# Patient Record
Sex: MALE | Race: WHITE | Employment: OTHER | URBAN - METROPOLITAN AREA
[De-identification: names, ages, dates, MRNs, and addresses within clinical notes are randomized per-mention and may not be internally consistent; named-entity substitution may affect disease eponyms.]

---

## 2021-10-29 ENCOUNTER — IMMUNIZATION (OUTPATIENT)
Dept: NURSING | Facility: CLINIC | Age: 61
End: 2021-10-29
Payer: COMMERCIAL

## 2021-10-29 PROCEDURE — 0004A PR COVID VAC PFIZER DIL RECON 30 MCG/0.3 ML IM: CPT

## 2021-10-29 PROCEDURE — 91300 PR COVID VAC PFIZER DIL RECON 30 MCG/0.3 ML IM: CPT

## 2022-03-14 VITALS
OXYGEN SATURATION: 95 % | TEMPERATURE: 97.6 F | SYSTOLIC BLOOD PRESSURE: 133 MMHG | WEIGHT: 180 LBS | DIASTOLIC BLOOD PRESSURE: 82 MMHG | RESPIRATION RATE: 16 BRPM | HEART RATE: 66 BPM

## 2022-03-14 PROCEDURE — 23500 CLTX CLAVICULAR FX W/O MNPJ: CPT | Mod: LT

## 2022-03-14 PROCEDURE — 99284 EMERGENCY DEPT VISIT MOD MDM: CPT | Mod: 25

## 2022-03-15 ENCOUNTER — HOSPITAL ENCOUNTER (EMERGENCY)
Facility: HOSPITAL | Age: 62
Discharge: HOME | End: 2022-03-15
Attending: EMERGENCY MEDICINE | Admitting: EMERGENCY MEDICINE
Payer: COMMERCIAL

## 2022-03-15 ENCOUNTER — APPOINTMENT (OUTPATIENT)
Dept: RADIOLOGY | Facility: HOSPITAL | Age: 62
End: 2022-03-15
Attending: EMERGENCY MEDICINE
Payer: COMMERCIAL

## 2022-03-15 DIAGNOSIS — M25.512 ACUTE PAIN OF LEFT SHOULDER: ICD-10-CM

## 2022-03-15 DIAGNOSIS — S42.032A CLOSED DISPLACED FRACTURE OF ACROMIAL END OF LEFT CLAVICLE, INITIAL ENCOUNTER: ICD-10-CM
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
## 2022-03-15 PROCEDURE — 73030 X-RAY EXAM OF SHOULDER: CPT | Mod: LT

## 2022-03-15 ASSESSMENT — ENCOUNTER SYMPTOMS
SHORTNESS OF BREATH: 0
NECK PAIN: 0
DIZZINESS: 0
BACK PAIN: 0
NUMBNESS: 1
CONSTITUTIONAL NEGATIVE: 1
PALPITATIONS: 0

## 2022-03-15 NOTE — DISCHARGE INSTRUCTIONS
You were seen in the emergency department today for shoulder pain after a fall.  As we discussed, your x-ray showed that you broke your clavicle.     To help with pain:  - Ice the injury for 20 minutes, 3-5 times per day (or up to every 2 hours as needed) for the first 24-72 hours. You can either use an ice pack or plastic bag filled with ice, cover either one with a damp cloth to prevent the cold from burning your skin.   - Wear the sling.   - Acetaminophen (Tylenol) 650 mgevery 8 hours. Do not take more than 3000 mg per day.   - Ibuprofen (Motrin, Advil) 400-600 mg every 6 hours. Do not take more than 3200 mg per day.   - Oxycodone 1-2 tablets every 4-6 hours for the [id number] hours. Only use these for severe pain that does not get better with tylenol and ibuprofen. This may make you feel drowsy so you should try to take it at night to lessen the pain and help you sleep.     You should take ibuprofen and tylenol for baseline pain. Write down the times you are taking both medications to ensure appropriate time in between doses.    If tylenol and ibuprofen are not enough, you can take theoxycodone. This does not contain acetaminophen so it is safe to take the tylenol/acetaminophen at the same time.   Note: Oxycodone is a strong narcotic pain medication that can lead to addiction and should beused carefully.  Please don't take more than the recommended dose and limit your use of this medication as much as possible.  Please don't take this medication with other medications or drugs that are also sedating or causeyou to feel sleepy (alcohol, benzodiazepines, etc) because it may impair your ability to breathe.  In addition, please do not take oxycodone prior to performing activities such as driving, operating power tools, or otheractivities that carry a risk of bodily harm.    You will likely find that you are more sore over the next 1-2 days, but please return to the Emergency Department if you have a significant  increase in pain, difficulty breathing, numbness, weakness, or any other new or concerning symptoms. Otherwise please follow up at [facility] tomorrow for recheck.    Below is some information that you might find informative and useful.    Thank you for choosing [facility]. It was a pleasure taking care of you today!  - Dr. Nissa Mckay

## 2022-03-15 NOTE — ED PROVIDER NOTES
EMERGENCY DEPARTMENT ENCOUNTER      NAME: Adan Tan  YOB: 1960  MRN: [id number]      FINAL IMPRESSION  1. Closed displaced fracture of acromial end of left clavicle, initial encounter    2. Acute pain of left shoulder    3. Fall, initial encounter        MEDICAL DECISION MAKING   Pertinent Labs & Imaging studies reviewed. (See chart for details)    Adan Tan is a 61 year old male who presents for evaluation of left shoulder pain after a fall.  Patient reports falling down about 3 stairs earlier this evening.  He landed on his left shoulder but denies associated head trauma or loss of consciousness.  He was able to get up after the fall and has been ambulatory since.  His only complaint is of pain to his left shoulder area.  He notes that he is supposed to have bilateral shoulder replacement at some point but that the right shoulder is actually more problematic than the left.  He has no other new complaints or injuries and had no preceding symptoms to suggest syncope or cardiopulmonary/neurologic etiology of fall.  He does admit that he was drinking earlier this evening.  Friend gave patient Tylenol prior to bringing him in and he has had some improvement in discomfort.  Patient is right-hand dominant.  Vitals on arrival stable. Remainder of history and exam, as below.     X-ray of the left shoulder was ordered while patient was awaiting room in triage.  This revealed a displaced fracture of the distal clavicle with associated AC separation.  I suspect this to be etiology of patient's pain.  I reviewed these results with him at time of my initial assessment.  He had no other concerning complaints or exam findings that I feel would indicate further work-up/advanced imaging.  We discussed options for further management and have agreed on plan for discharge with a sling and close follow-up in [facility] tomorrow.  I did offer to give and/or prescribe patient something  stronger to take for pain but he declined, as he has oxycodone left over from a previous hip replacement that he never used.  He also is not interested in any analgesic here in the department, states that he is very comfortable if he keeps his arm still.  I encouraged him to use Tylenol/ibuprofen, rest, ice primarily and take the oxycodone if these measures are not helpful.    Patient was placed in a sling and felt more comfortable with additional support from his arm.  After this, he was eager and ready to go home.  He feels comfortable with plan to follow-up in clinic. We reviewed warning signs and symptoms, and I instructed Mr. Tan to return to the emergency department immediately if he develops any new or worsening symptoms. I provided additional verbal discharge instructions. Mr. Tan expressed understanding and agreement with this plan of care, his questions were answered, and he was discharged in stable condition.       ED COURSE  12:52 AM I met with the patient, obtained history, performed an initial exam, and discussed options and plan for diagnostics and treatment here in the ED. Also updated with XR and discussed discharge plans. He was agreeable with plan. PPE worn: surgical mask, gloves and eye protection.       MEDICATIONS GIVEN IN THE ED  Medications - No data to display    NEW PRESCRIPTIONS STARTED AT TODAY'S VISIT  There are no discharge medications for this patient.         =================================================================    Chief Complaint   Patient presents with     Fall     Shoulder Injury         HPI:    Patient information was obtained from: patient     Use of : N/A     Adan Tan is a 61 year old male with PMHx of arthritis of right hip and shoulder, who presents mechanical fall and left shoulder pain.    Patient sustained mechanical fall ~10:30 PM last night down 3 steps of stairs. He did have 4 glasses of wine tonight before incident. He did hit his  left shoulder against the wall immediately endorsing pain and landed on the ground on his head. He did not sustain LOC or any other injuries noted. His pain does worsen with movement but at rest, the pain is not triggered. Prior to arrival, he did take medication for pain management. He was able to get up and ambulate well. He was previously on blood thinners but no longer taking them since end of February. He is right hand dominant. He does note having chronic slight numbness from his arthritis but nothing out of the ordinary. He does mention leaving this Wednesday to [facility] since his wife is a travel nurse. Otherwise, no neck pain, back pain, leg pain, palpitations, dizziness, shortness of breath. No other medical complaints at this time.       RELEVANT HISTORY, MEDICATIONS, & ALLERGIES   Past medical history, surgical history, family history, medications, and allergies reviewed and pertinent noted in HPI. See end of note for comprehensive list.    REVIEW OF SYSTEMS:  Review of Systems   Constitutional: Negative.    Respiratory: Negative for shortness of breath.    Cardiovascular: Negative for chest pain and palpitations.   Musculoskeletal: Negative for back pain and neck pain.        Positive left shoulder pain. Negative leg pain   Neurological: Positive for numbness (chronic, attributes to known degenerative disease in shoulders). Negative for dizziness and syncope.   All other systems reviewed and are negative.      PHYSICAL EXAM:    Vitals: /82   Pulse 66   Temp 97.6  F (36.4  C) (Oral)   Resp 16   Wt 81.6 kg (180 lb)   SpO2 95%   General: Awake, alert, interactive.  Sitting up in wheelchair.   Eyes: PERRL.   HENT: Atraumatic. MMM.   Neck: Full AROM.  No midline tenderness.  Cardiovascular: Regular rate and rhythm.  2+ radial pulses.  Respiratory/Chest: Normal work of breathing.  Lungs clear to auscultation bilaterally.  No chest wall tenderness to palpation or external signs of  trauma.  Abdomen: Non-distended.   Left upper extremity: Holding arm in flexion at 90 degrees at elbow abducted towards body.  Mild tenderness palpation over distal clavicle.  Limited range of motion of shoulder secondary to pain.  Normal range of motion of wrist and elbow.  Sensation intact all distributions.  No tenderness to palpation of long bones, elbow, wrist, hand.  MSK: Otherwise normal appearing extremities without obvious deformities or signs of trauma.  No midline tenderness to palpation of spine.  Skin: Normal color. No rash or diaphoresis.  Neurologic: Alert, oriented. Speech clear. CN's grossly intact.   Psychiatric: Normal affect/mood.     RADIOLOGY  XR Shoulder Left G/E 3 Views   Final Result   IMPRESSION: Fracture of the distal clavicle and subluxation at the AC joint. The acromion on is subluxed approximately 4 cm inferior to the distal clavicle. Chronic deformity of the humeral head. No dislocation.           Comprehensive outline of EPIC chart Hx  PAST MEDICAL HISTORY    History reviewed. No pertinent past medical history.  History reviewed. No pertinent surgical history.    CURRENT MEDICATIONS    No current outpatient medications    ALLERGIES    No Known Allergies    FAMILY HISTORY    History reviewed. No pertinent family history.    SOCIAL HISTORY      Occasional alcohol use  Non-smoker    IApple, am serving as a scribe to document services personally performed by Dr. Nissa Mckay based on my observation and the provider's statements to me. INissa MD attest that Apple Ponce is acting in a scribe capacity, has observed my performance of the services and has documented them in accordance with my direction.    Nissa Mckay M.D.  Emergency Medicine  [facility] EMERGENCY DEPARTMENT  [facility] [facility] [id number] [phone]  Dept: [phone]     Nissa Mckay MD  03/15/22 [id number]

## 2022-03-15 NOTE — ED TRIAGE NOTES
Pt was walking downstairs to go to bed and tripped down the last 4-5 stairs, hitting his head and hurting his left shoulder. No LOC. No thinners. Last meal 1900, also after that has had about 4 good size glasses of wine throughout the evening. Medics came to the home and gave pt the option of being transported or to drive in with his friend. Friend is here and drove pt. Left shoulder could be dislocated from appearance in triage.

## 2022-03-16 ENCOUNTER — ANESTHESIA EVENT (OUTPATIENT)
Dept: SURGERY | Facility: HOSPITAL | Age: 62
End: 2022-03-16
Payer: COMMERCIAL

## 2022-03-16 ENCOUNTER — HOSPITAL ENCOUNTER (INPATIENT)
Facility: HOSPITAL | Age: 62
LOS: 4 days | Discharge: HOME | End: 2022-03-20
Attending: EMERGENCY MEDICINE | Admitting: STUDENT IN AN ORGANIZED HEALTH CARE EDUCATION/TRAINING PROGRAM
Payer: COMMERCIAL

## 2022-03-16 ENCOUNTER — APPOINTMENT (OUTPATIENT)
Dept: CT IMAGING | Facility: HOSPITAL | Age: 62
End: 2022-03-16
Payer: COMMERCIAL

## 2022-03-16 ENCOUNTER — ANESTHESIA (OUTPATIENT)
Dept: SURGERY | Facility: HOSPITAL | Age: 62
End: 2022-03-16
Payer: COMMERCIAL

## 2022-03-16 ENCOUNTER — APPOINTMENT (OUTPATIENT)
Dept: RADIOLOGY | Facility: HOSPITAL | Age: 62
End: 2022-03-16
Attending: SURGERY
Payer: COMMERCIAL

## 2022-03-16 DIAGNOSIS — J94.2 HEMOTHORAX ON LEFT: Primary | ICD-10-CM

## 2022-03-16 PROBLEM — N52.9 ERECTILE DYSFUNCTION: Status: ACTIVE | Noted: 2021-10-20

## 2022-03-16 PROBLEM — M19.019 ARTHRITIS OF SHOULDER: Status: ACTIVE | Noted: 2021-10-20

## 2022-03-16 PROBLEM — M16.11 ARTHRITIS OF RIGHT HIP: Status: ACTIVE | Noted: 2021-10-20

## 2022-03-16 LAB
ABO/RH(D): NORMAL
ALBUMIN SERPL-MCNC: 3.5 G/DL (ref 3.5–5)
ALP SERPL-CCNC: 56 U/L (ref 45–120)
ALT SERPL W P-5'-P-CCNC: 13 U/L (ref 0–45)
ANION GAP SERPL CALCULATED.3IONS-SCNC: 10 MMOL/L (ref 5–18)
ANTIBODY SCREEN: NEGATIVE
AST SERPL W P-5'-P-CCNC: 22 U/L (ref 0–40)
ATRIAL RATE - MUSE: 101 BPM
BASOPHILS # BLD AUTO: 0 10E3/UL (ref 0–0.2)
BASOPHILS NFR BLD AUTO: 0 %
BILIRUB SERPL-MCNC: 0.9 MG/DL (ref 0–1)
BUN SERPL-MCNC: 14 MG/DL (ref 8–22)
CALCIUM SERPL-MCNC: 8.5 MG/DL (ref 8.5–10.5)
CHLORIDE BLD-SCNC: 103 MMOL/L (ref 98–107)
CO2 SERPL-SCNC: 24 MMOL/L (ref 22–31)
CREAT BLD-MCNC: 0.6 MG/DL (ref 0.7–1.3)
CREAT SERPL-MCNC: 0.61 MG/DL (ref 0.7–1.3)
CREAT SERPL-MCNC: 0.72 MG/DL (ref 0.7–1.3)
DIASTOLIC BLOOD PRESSURE - MUSE: NORMAL MMHG
EOSINOPHIL # BLD AUTO: 0 10E3/UL (ref 0–0.7)
EOSINOPHIL NFR BLD AUTO: 0 %
ERYTHROCYTE [DISTWIDTH] IN BLOOD BY AUTOMATED COUNT: 12.7 % (ref 10–15)
GFR SERPL CREATININE-BSD FRML MDRD: >60 ML/MIN/1.73M2
GFR SERPL CREATININE-BSD FRML MDRD: >90 ML/MIN/1.73M2
GFR SERPL CREATININE-BSD FRML MDRD: >90 ML/MIN/1.73M2
GLUCOSE BLD-MCNC: 168 MG/DL (ref 70–125)
HCT VFR BLD AUTO: 34.5 % (ref 40–53)
HGB BLD-MCNC: 11.3 G/DL (ref 13.3–17.7)
IMM GRANULOCYTES # BLD: 0.1 10E3/UL
IMM GRANULOCYTES NFR BLD: 1 %
INTERPRETATION ECG - MUSE: NORMAL
LYMPHOCYTES # BLD AUTO: 1 10E3/UL (ref 0.8–5.3)
LYMPHOCYTES NFR BLD AUTO: 6 %
MCH RBC QN AUTO: 34 PG (ref 26.5–33)
MCHC RBC AUTO-ENTMCNC: 32.8 G/DL (ref 31.5–36.5)
MCV RBC AUTO: 104 FL (ref 78–100)
MONOCYTES # BLD AUTO: 1.4 10E3/UL (ref 0–1.3)
MONOCYTES NFR BLD AUTO: 9 %
NEUTROPHILS # BLD AUTO: 13.2 10E3/UL (ref 1.6–8.3)
NEUTROPHILS NFR BLD AUTO: 84 %
NRBC # BLD AUTO: 0 10E3/UL
NRBC BLD AUTO-RTO: 0 /100
P AXIS - MUSE: 51 DEGREES
PLATELET # BLD AUTO: 231 10E3/UL (ref 150–450)
POTASSIUM BLD-SCNC: 3.7 MMOL/L (ref 3.5–5)
PR INTERVAL - MUSE: 154 MS
PROT SERPL-MCNC: 6 G/DL (ref 6–8)
QRS DURATION - MUSE: 82 MS
QT - MUSE: 332 MS
QTC - MUSE: 430 MS
R AXIS - MUSE: 42 DEGREES
RBC # BLD AUTO: 3.32 10E6/UL (ref 4.4–5.9)
SARS-COV-2 RNA RESP QL NAA+PROBE: NEGATIVE
SODIUM SERPL-SCNC: 137 MMOL/L (ref 136–145)
SPECIMEN EXPIRATION DATE: NORMAL
SYSTOLIC BLOOD PRESSURE - MUSE: NORMAL MMHG
T AXIS - MUSE: 26 DEGREES
VENTRICULAR RATE- MUSE: 101 BPM
WBC # BLD AUTO: 15.6 10E3/UL (ref 4–11)

## 2022-03-16 PROCEDURE — 999N000141 HC STATISTIC PRE-PROCEDURE NURSING ASSESSMENT: Performed by: SURGERY

## 2022-03-16 PROCEDURE — 258N000003 HC RX IP 258 OP 636: Performed by: SURGERY

## 2022-03-16 PROCEDURE — 85025 COMPLETE CBC W/AUTO DIFF WBC: CPT | Performed by: PHYSICIAN ASSISTANT

## 2022-03-16 PROCEDURE — 250N000011 HC RX IP 250 OP 636: Performed by: SURGERY

## 2022-03-16 PROCEDURE — 96361 HYDRATE IV INFUSION ADD-ON: CPT

## 2022-03-16 PROCEDURE — 36415 COLL VENOUS BLD VENIPUNCTURE: CPT | Performed by: SURGERY

## 2022-03-16 PROCEDURE — 87635 SARS-COV-2 COVID-19 AMP PRB: CPT | Performed by: PHYSICIAN ASSISTANT

## 2022-03-16 PROCEDURE — 999N000065 XR CHEST PORT 1 VIEW

## 2022-03-16 PROCEDURE — 272N000001 HC OR GENERAL SUPPLY STERILE: Performed by: SURGERY

## 2022-03-16 PROCEDURE — 36415 COLL VENOUS BLD VENIPUNCTURE: CPT | Performed by: ANESTHESIOLOGY

## 2022-03-16 PROCEDURE — 32601 THORACOSCOPY DIAGNOSTIC: CPT | Performed by: SURGERY

## 2022-03-16 PROCEDURE — 82565 ASSAY OF CREATININE: CPT | Performed by: SURGERY

## 2022-03-16 PROCEDURE — 99222 1ST HOSP IP/OBS MODERATE 55: CPT | Mod: AI | Performed by: STUDENT IN AN ORGANIZED HEALTH CARE EDUCATION/TRAINING PROGRAM

## 2022-03-16 PROCEDURE — C9803 HOPD COVID-19 SPEC COLLECT: HCPCS

## 2022-03-16 PROCEDURE — 250N000011 HC RX IP 250 OP 636: Performed by: ANESTHESIOLOGY

## 2022-03-16 PROCEDURE — 86901 BLOOD TYPING SEROLOGIC RH(D): CPT | Performed by: ANESTHESIOLOGY

## 2022-03-16 PROCEDURE — 250N000011 HC RX IP 250 OP 636: Performed by: NURSE ANESTHETIST, CERTIFIED REGISTERED

## 2022-03-16 PROCEDURE — 96360 HYDRATION IV INFUSION INIT: CPT

## 2022-03-16 PROCEDURE — 250N000011 HC RX IP 250 OP 636: Performed by: PHYSICIAN ASSISTANT

## 2022-03-16 PROCEDURE — 258N000003 HC RX IP 258 OP 636: Performed by: PHYSICIAN ASSISTANT

## 2022-03-16 PROCEDURE — P9041 ALBUMIN (HUMAN),5%, 50ML: HCPCS | Performed by: NURSE ANESTHETIST, CERTIFIED REGISTERED

## 2022-03-16 PROCEDURE — 80053 COMPREHEN METABOLIC PANEL: CPT | Performed by: PHYSICIAN ASSISTANT

## 2022-03-16 PROCEDURE — 86850 RBC ANTIBODY SCREEN: CPT | Performed by: ANESTHESIOLOGY

## 2022-03-16 PROCEDURE — 710N000009 HC RECOVERY PHASE 1, LEVEL 1, PER MIN: Performed by: SURGERY

## 2022-03-16 PROCEDURE — 120N000001 HC R&B MED SURG/OB

## 2022-03-16 PROCEDURE — 250N000013 HC RX MED GY IP 250 OP 250 PS 637: Performed by: ANESTHESIOLOGY

## 2022-03-16 PROCEDURE — 258N000003 HC RX IP 258 OP 636: Performed by: ANESTHESIOLOGY

## 2022-03-16 PROCEDURE — 99222 1ST HOSP IP/OBS MODERATE 55: CPT | Mod: 25 | Performed by: SURGERY

## 2022-03-16 PROCEDURE — 258N000003 HC RX IP 258 OP 636: Performed by: NURSE ANESTHETIST, CERTIFIED REGISTERED

## 2022-03-16 PROCEDURE — 258N000001 HC RX 258: Performed by: SURGERY

## 2022-03-16 PROCEDURE — 250N000013 HC RX MED GY IP 250 OP 250 PS 637: Performed by: SURGERY

## 2022-03-16 PROCEDURE — 250N000009 HC RX 250: Performed by: SURGERY

## 2022-03-16 PROCEDURE — 93005 ELECTROCARDIOGRAM TRACING: CPT | Performed by: STUDENT IN AN ORGANIZED HEALTH CARE EDUCATION/TRAINING PROGRAM

## 2022-03-16 PROCEDURE — 0W9B30Z DRAINAGE OF LEFT PLEURAL CAVITY WITH DRAINAGE DEVICE, PERCUTANEOUS APPROACH: ICD-10-PCS | Performed by: SURGERY

## 2022-03-16 PROCEDURE — 250N000009 HC RX 250: Performed by: NURSE ANESTHETIST, CERTIFIED REGISTERED

## 2022-03-16 PROCEDURE — 0W9B3ZZ DRAINAGE OF LEFT PLEURAL CAVITY, PERCUTANEOUS APPROACH: ICD-10-PCS | Performed by: SURGERY

## 2022-03-16 PROCEDURE — 360N000077 HC SURGERY LEVEL 4, PER MIN: Performed by: SURGERY

## 2022-03-16 PROCEDURE — 370N000017 HC ANESTHESIA TECHNICAL FEE, PER MIN: Performed by: SURGERY

## 2022-03-16 PROCEDURE — 82565 ASSAY OF CREATININE: CPT

## 2022-03-16 PROCEDURE — 74177 CT ABD & PELVIS W/CONTRAST: CPT

## 2022-03-16 PROCEDURE — 250N000025 HC SEVOFLURANE, PER MIN: Performed by: SURGERY

## 2022-03-16 PROCEDURE — 0W9930Z DRAINAGE OF RIGHT PLEURAL CAVITY WITH DRAINAGE DEVICE, PERCUTANEOUS APPROACH: ICD-10-PCS | Performed by: SURGERY

## 2022-03-16 PROCEDURE — 36415 COLL VENOUS BLD VENIPUNCTURE: CPT | Performed by: PHYSICIAN ASSISTANT

## 2022-03-16 PROCEDURE — 93005 ELECTROCARDIOGRAM TRACING: CPT | Performed by: EMERGENCY MEDICINE

## 2022-03-16 PROCEDURE — 99285 EMERGENCY DEPT VISIT HI MDM: CPT | Mod: 25

## 2022-03-16 RX ORDER — BUPIVACAINE HYDROCHLORIDE 2.5 MG/ML
INJECTION, SOLUTION EPIDURAL; INFILTRATION; INTRACAUDAL PRN
Status: DISCONTINUED | OUTPATIENT
Start: 2022-03-16 | End: 2022-03-16

## 2022-03-16 RX ORDER — SODIUM CHLORIDE, SODIUM LACTATE, POTASSIUM CHLORIDE, CALCIUM CHLORIDE 600; 310; 30; 20 MG/100ML; MG/100ML; MG/100ML; MG/100ML
INJECTION, SOLUTION INTRAVENOUS CONTINUOUS
Status: DISCONTINUED | OUTPATIENT
Start: 2022-03-16 | End: 2022-03-16 | Stop reason: HOSPADM

## 2022-03-16 RX ORDER — ONDANSETRON 2 MG/ML
4 INJECTION INTRAMUSCULAR; INTRAVENOUS EVERY 6 HOURS PRN
Status: DISCONTINUED | OUTPATIENT
Start: 2022-03-16 | End: 2022-03-20 | Stop reason: HOSPADM

## 2022-03-16 RX ORDER — ALBUMIN, HUMAN INJ 5% 5 %
SOLUTION INTRAVENOUS CONTINUOUS PRN
Status: DISCONTINUED | OUTPATIENT
Start: 2022-03-16 | End: 2022-03-16

## 2022-03-16 RX ORDER — SODIUM CHLORIDE 9 MG/ML
INJECTION, SOLUTION INTRAVENOUS CONTINUOUS
Status: DISCONTINUED | OUTPATIENT
Start: 2022-03-16 | End: 2022-03-17

## 2022-03-16 RX ORDER — ACETAMINOPHEN 325 MG/1
650 TABLET ORAL EVERY 4 HOURS PRN
Status: DISCONTINUED | OUTPATIENT
Start: 2022-03-19 | End: 2022-03-20 | Stop reason: HOSPADM

## 2022-03-16 RX ORDER — NALOXONE HYDROCHLORIDE 0.4 MG/ML
0.4 INJECTION, SOLUTION INTRAMUSCULAR; INTRAVENOUS; SUBCUTANEOUS
Status: DISCONTINUED | OUTPATIENT
Start: 2022-03-16 | End: 2022-03-20 | Stop reason: HOSPADM

## 2022-03-16 RX ORDER — ACETAMINOPHEN 325 MG/1
975 TABLET ORAL EVERY 8 HOURS
Status: COMPLETED | OUTPATIENT
Start: 2022-03-16 | End: 2022-03-19

## 2022-03-16 RX ORDER — FENTANYL CITRATE 50 UG/ML
INJECTION, SOLUTION INTRAMUSCULAR; INTRAVENOUS PRN
Status: DISCONTINUED | OUTPATIENT
Start: 2022-03-16 | End: 2022-03-16

## 2022-03-16 RX ORDER — PROPOFOL 10 MG/ML
INJECTION, EMULSION INTRAVENOUS CONTINUOUS PRN
Status: DISCONTINUED | OUTPATIENT
Start: 2022-03-16 | End: 2022-03-16

## 2022-03-16 RX ORDER — ONDANSETRON 4 MG/1
4 TABLET, ORALLY DISINTEGRATING ORAL EVERY 6 HOURS PRN
Status: DISCONTINUED | OUTPATIENT
Start: 2022-03-16 | End: 2022-03-16

## 2022-03-16 RX ORDER — MAGNESIUM HYDROXIDE 1200 MG/15ML
LIQUID ORAL PRN
Status: DISCONTINUED | OUTPATIENT
Start: 2022-03-16 | End: 2022-03-20

## 2022-03-16 RX ORDER — SODIUM CHLORIDE, SODIUM LACTATE, POTASSIUM CHLORIDE, CALCIUM CHLORIDE 600; 310; 30; 20 MG/100ML; MG/100ML; MG/100ML; MG/100ML
INJECTION, SOLUTION INTRAVENOUS CONTINUOUS PRN
Status: DISCONTINUED | OUTPATIENT
Start: 2022-03-16 | End: 2022-03-16

## 2022-03-16 RX ORDER — CEFAZOLIN SODIUM/WATER 2 G/20 ML
2 SYRINGE (ML) INTRAVENOUS
Status: COMPLETED | OUTPATIENT
Start: 2022-03-16 | End: 2022-03-16

## 2022-03-16 RX ORDER — HYDROMORPHONE HCL IN WATER/PF 6 MG/30 ML
0.4 PATIENT CONTROLLED ANALGESIA SYRINGE INTRAVENOUS
Status: DISCONTINUED | OUTPATIENT
Start: 2022-03-16 | End: 2022-03-20 | Stop reason: HOSPADM

## 2022-03-16 RX ORDER — ONDANSETRON 2 MG/ML
4 INJECTION INTRAMUSCULAR; INTRAVENOUS EVERY 6 HOURS PRN
Status: DISCONTINUED | OUTPATIENT
Start: 2022-03-16 | End: 2022-03-16

## 2022-03-16 RX ORDER — DEXAMETHASONE SODIUM PHOSPHATE 10 MG/ML
INJECTION, SOLUTION INTRAMUSCULAR; INTRAVENOUS PRN
Status: DISCONTINUED | OUTPATIENT
Start: 2022-03-16 | End: 2022-03-16

## 2022-03-16 RX ORDER — BISACODYL 10 MG
10 SUPPOSITORY, RECTAL RECTAL DAILY PRN
Status: DISCONTINUED | OUTPATIENT
Start: 2022-03-16 | End: 2022-03-20 | Stop reason: HOSPADM

## 2022-03-16 RX ORDER — ONDANSETRON 4 MG/1
4 TABLET, ORALLY DISINTEGRATING ORAL EVERY 6 HOURS PRN
Status: DISCONTINUED | OUTPATIENT
Start: 2022-03-16 | End: 2022-03-20 | Stop reason: HOSPADM

## 2022-03-16 RX ORDER — HYDROMORPHONE HYDROCHLORIDE 1 MG/ML
0.4 INJECTION, SOLUTION INTRAMUSCULAR; INTRAVENOUS; SUBCUTANEOUS EVERY 5 MIN PRN
Status: DISCONTINUED | OUTPATIENT
Start: 2022-03-16 | End: 2022-03-16 | Stop reason: HOSPADM

## 2022-03-16 RX ORDER — PROPOFOL 10 MG/ML
INJECTION, EMULSION INTRAVENOUS PRN
Status: DISCONTINUED | OUTPATIENT
Start: 2022-03-16 | End: 2022-03-16

## 2022-03-16 RX ORDER — IOPAMIDOL 755 MG/ML
100 INJECTION, SOLUTION INTRAVASCULAR ONCE
Status: COMPLETED | OUTPATIENT
Start: 2022-03-16 | End: 2022-03-16

## 2022-03-16 RX ORDER — LIDOCAINE 40 MG/G
CREAM TOPICAL
Status: DISCONTINUED | OUTPATIENT
Start: 2022-03-16 | End: 2022-03-20 | Stop reason: HOSPADM

## 2022-03-16 RX ORDER — MAGNESIUM SULFATE 4 G/50ML
4 INJECTION INTRAVENOUS ONCE
Status: COMPLETED | OUTPATIENT
Start: 2022-03-16 | End: 2022-03-16

## 2022-03-16 RX ORDER — NALBUPHINE HYDROCHLORIDE 10 MG/ML
2.5-5 INJECTION, SOLUTION INTRAMUSCULAR; INTRAVENOUS; SUBCUTANEOUS EVERY 6 HOURS PRN
Status: DISCONTINUED | OUTPATIENT
Start: 2022-03-16 | End: 2022-03-20 | Stop reason: HOSPADM

## 2022-03-16 RX ORDER — NALOXONE HYDROCHLORIDE 0.4 MG/ML
0.2 INJECTION, SOLUTION INTRAMUSCULAR; INTRAVENOUS; SUBCUTANEOUS
Status: DISCONTINUED | OUTPATIENT
Start: 2022-03-16 | End: 2022-03-20 | Stop reason: HOSPADM

## 2022-03-16 RX ORDER — OXYCODONE HYDROCHLORIDE 5 MG/1
10 TABLET ORAL EVERY 4 HOURS PRN
Status: DISCONTINUED | OUTPATIENT
Start: 2022-03-16 | End: 2022-03-20 | Stop reason: HOSPADM

## 2022-03-16 RX ORDER — OXYCODONE HYDROCHLORIDE 5 MG/1
5 TABLET ORAL EVERY 4 HOURS PRN
Status: DISCONTINUED | OUTPATIENT
Start: 2022-03-16 | End: 2022-03-16 | Stop reason: HOSPADM

## 2022-03-16 RX ORDER — ACETAMINOPHEN 325 MG/1
975 TABLET ORAL ONCE
Status: COMPLETED | OUTPATIENT
Start: 2022-03-16 | End: 2022-03-16

## 2022-03-16 RX ORDER — ONDANSETRON 2 MG/ML
INJECTION INTRAMUSCULAR; INTRAVENOUS PRN
Status: DISCONTINUED | OUTPATIENT
Start: 2022-03-16 | End: 2022-03-16

## 2022-03-16 RX ORDER — ONDANSETRON 4 MG/1
4 TABLET, ORALLY DISINTEGRATING ORAL EVERY 30 MIN PRN
Status: DISCONTINUED | OUTPATIENT
Start: 2022-03-16 | End: 2022-03-16 | Stop reason: HOSPADM

## 2022-03-16 RX ORDER — FENTANYL CITRATE 50 UG/ML
50 INJECTION, SOLUTION INTRAMUSCULAR; INTRAVENOUS EVERY 5 MIN PRN
Status: DISCONTINUED | OUTPATIENT
Start: 2022-03-16 | End: 2022-03-16 | Stop reason: HOSPADM

## 2022-03-16 RX ORDER — ONDANSETRON 2 MG/ML
4 INJECTION INTRAMUSCULAR; INTRAVENOUS EVERY 30 MIN PRN
Status: DISCONTINUED | OUTPATIENT
Start: 2022-03-16 | End: 2022-03-16 | Stop reason: HOSPADM

## 2022-03-16 RX ORDER — HYDROMORPHONE HCL IN WATER/PF 6 MG/30 ML
0.2 PATIENT CONTROLLED ANALGESIA SYRINGE INTRAVENOUS
Status: DISCONTINUED | OUTPATIENT
Start: 2022-03-16 | End: 2022-03-20 | Stop reason: HOSPADM

## 2022-03-16 RX ORDER — POLYETHYLENE GLYCOL 3350 17 G/17G
17 POWDER, FOR SOLUTION ORAL DAILY
Status: DISCONTINUED | OUTPATIENT
Start: 2022-03-17 | End: 2022-03-20 | Stop reason: HOSPADM

## 2022-03-16 RX ORDER — KETAMINE HYDROCHLORIDE 50 MG/ML
INJECTION, SOLUTION INTRAMUSCULAR; INTRAVENOUS PRN
Status: DISCONTINUED | OUTPATIENT
Start: 2022-03-16 | End: 2022-03-16

## 2022-03-16 RX ORDER — LIDOCAINE HYDROCHLORIDE 20 MG/ML
INJECTION, SOLUTION INFILTRATION; PERINEURAL PRN
Status: DISCONTINUED | OUTPATIENT
Start: 2022-03-16 | End: 2022-03-16

## 2022-03-16 RX ORDER — HALOPERIDOL 5 MG/ML
1 INJECTION INTRAMUSCULAR
Status: DISCONTINUED | OUTPATIENT
Start: 2022-03-16 | End: 2022-03-16 | Stop reason: HOSPADM

## 2022-03-16 RX ORDER — OXYCODONE HYDROCHLORIDE 5 MG/1
5 TABLET ORAL EVERY 4 HOURS PRN
Status: DISCONTINUED | OUTPATIENT
Start: 2022-03-16 | End: 2022-03-20 | Stop reason: HOSPADM

## 2022-03-16 RX ORDER — AMOXICILLIN 250 MG
1 CAPSULE ORAL 2 TIMES DAILY
Status: DISCONTINUED | OUTPATIENT
Start: 2022-03-16 | End: 2022-03-20 | Stop reason: HOSPADM

## 2022-03-16 RX ORDER — PROCHLORPERAZINE MALEATE 10 MG
10 TABLET ORAL EVERY 6 HOURS PRN
Status: DISCONTINUED | OUTPATIENT
Start: 2022-03-16 | End: 2022-03-20 | Stop reason: HOSPADM

## 2022-03-16 RX ORDER — LIDOCAINE 40 MG/G
CREAM TOPICAL
Status: DISCONTINUED | OUTPATIENT
Start: 2022-03-16 | End: 2022-03-16 | Stop reason: HOSPADM

## 2022-03-16 RX ORDER — CEFAZOLIN SODIUM/WATER 2 G/20 ML
2 SYRINGE (ML) INTRAVENOUS SEE ADMIN INSTRUCTIONS
Status: DISCONTINUED | OUTPATIENT
Start: 2022-03-16 | End: 2022-03-16 | Stop reason: HOSPADM

## 2022-03-16 RX ADMIN — FENTANYL CITRATE 50 MCG: 50 INJECTION, SOLUTION INTRAMUSCULAR; INTRAVENOUS at 15:29

## 2022-03-16 RX ADMIN — LIDOCAINE HYDROCHLORIDE 3 ML: 20 INJECTION, SOLUTION INFILTRATION; PERINEURAL at 14:01

## 2022-03-16 RX ADMIN — SODIUM CHLORIDE, POTASSIUM CHLORIDE, SODIUM LACTATE AND CALCIUM CHLORIDE: 600; 310; 30; 20 INJECTION, SOLUTION INTRAVENOUS at 14:48

## 2022-03-16 RX ADMIN — ROCURONIUM BROMIDE 20 MG: 50 INJECTION, SOLUTION INTRAVENOUS at 14:44

## 2022-03-16 RX ADMIN — FENTANYL CITRATE 50 MCG: 50 INJECTION, SOLUTION INTRAMUSCULAR; INTRAVENOUS at 14:01

## 2022-03-16 RX ADMIN — ROCURONIUM BROMIDE 20 MG: 50 INJECTION, SOLUTION INTRAVENOUS at 14:25

## 2022-03-16 RX ADMIN — MAGNESIUM SULFATE HEPTAHYDRATE 4 G: 80 INJECTION, SOLUTION INTRAVENOUS at 13:23

## 2022-03-16 RX ADMIN — FENTANYL CITRATE 50 MCG: 50 INJECTION, SOLUTION INTRAMUSCULAR; INTRAVENOUS at 13:28

## 2022-03-16 RX ADMIN — KETAMINE HYDROCHLORIDE 50 MG: 50 INJECTION, SOLUTION INTRAMUSCULAR; INTRAVENOUS at 14:01

## 2022-03-16 RX ADMIN — Medication 2 G: at 14:15

## 2022-03-16 RX ADMIN — SODIUM CHLORIDE, POTASSIUM CHLORIDE, SODIUM LACTATE AND CALCIUM CHLORIDE: 600; 310; 30; 20 INJECTION, SOLUTION INTRAVENOUS at 14:10

## 2022-03-16 RX ADMIN — ACETAMINOPHEN 975 MG: 325 TABLET ORAL at 19:48

## 2022-03-16 RX ADMIN — HYDROMORPHONE HYDROCHLORIDE 0.4 MG: 0.2 INJECTION, SOLUTION INTRAMUSCULAR; INTRAVENOUS; SUBCUTANEOUS at 19:42

## 2022-03-16 RX ADMIN — ROCURONIUM BROMIDE 50 MG: 50 INJECTION, SOLUTION INTRAVENOUS at 14:01

## 2022-03-16 RX ADMIN — SODIUM CHLORIDE: 9 INJECTION, SOLUTION INTRAVENOUS at 18:28

## 2022-03-16 RX ADMIN — FENTANYL CITRATE 50 MCG: 50 INJECTION, SOLUTION INTRAMUSCULAR; INTRAVENOUS at 16:04

## 2022-03-16 RX ADMIN — PROPOFOL 20 MCG/KG/MIN: 10 INJECTION, EMULSION INTRAVENOUS at 14:25

## 2022-03-16 RX ADMIN — FENTANYL CITRATE 50 MCG: 50 INJECTION, SOLUTION INTRAMUSCULAR; INTRAVENOUS at 16:16

## 2022-03-16 RX ADMIN — ACETAMINOPHEN 975 MG: 325 TABLET ORAL at 13:20

## 2022-03-16 RX ADMIN — PHENYLEPHRINE HYDROCHLORIDE 0.5 MCG/KG/MIN: 10 INJECTION INTRAVENOUS at 13:59

## 2022-03-16 RX ADMIN — PHENYLEPHRINE HYDROCHLORIDE 200 MCG: 10 INJECTION INTRAVENOUS at 14:09

## 2022-03-16 RX ADMIN — PHENYLEPHRINE HYDROCHLORIDE 200 MCG: 10 INJECTION INTRAVENOUS at 14:23

## 2022-03-16 RX ADMIN — SODIUM CHLORIDE 1000 ML: 9 INJECTION, SOLUTION INTRAVENOUS at 10:35

## 2022-03-16 RX ADMIN — ALBUMIN HUMAN: 0.05 INJECTION, SOLUTION INTRAVENOUS at 14:34

## 2022-03-16 RX ADMIN — PHENYLEPHRINE HYDROCHLORIDE 200 MCG: 10 INJECTION INTRAVENOUS at 14:01

## 2022-03-16 RX ADMIN — IOPAMIDOL 100 ML: 755 INJECTION, SOLUTION INTRAVENOUS at 10:48

## 2022-03-16 RX ADMIN — ALBUMIN HUMAN: 0.05 INJECTION, SOLUTION INTRAVENOUS at 14:50

## 2022-03-16 RX ADMIN — BUPIVACAINE HYDROCHLORIDE 3 ML: 2.5 INJECTION, SOLUTION EPIDURAL; INFILTRATION; INTRACAUDAL at 15:10

## 2022-03-16 RX ADMIN — ONDANSETRON 4 MG: 2 INJECTION INTRAMUSCULAR; INTRAVENOUS at 14:40

## 2022-03-16 RX ADMIN — MIDAZOLAM 1 MG: 1 INJECTION INTRAMUSCULAR; INTRAVENOUS at 13:28

## 2022-03-16 RX ADMIN — SODIUM CHLORIDE, POTASSIUM CHLORIDE, SODIUM LACTATE AND CALCIUM CHLORIDE: 600; 310; 30; 20 INJECTION, SOLUTION INTRAVENOUS at 13:14

## 2022-03-16 RX ADMIN — SUGAMMADEX 200 MG: 100 INJECTION, SOLUTION INTRAVENOUS at 15:08

## 2022-03-16 RX ADMIN — DEXAMETHASONE SODIUM PHOSPHATE 10 MG: 10 INJECTION, SOLUTION INTRAMUSCULAR; INTRAVENOUS at 14:01

## 2022-03-16 RX ADMIN — BUPIVACAINE HYDROCHLORIDE: 7.5 INJECTION, SOLUTION EPIDURAL; RETROBULBAR at 18:10

## 2022-03-16 RX ADMIN — PROPOFOL 150 MG: 10 INJECTION, EMULSION INTRAVENOUS at 14:01

## 2022-03-16 RX ADMIN — HYDROMORPHONE HYDROCHLORIDE 1 MG: 1 INJECTION, SOLUTION INTRAMUSCULAR; INTRAVENOUS; SUBCUTANEOUS at 14:32

## 2022-03-16 RX ADMIN — MIDAZOLAM 1 MG: 1 INJECTION INTRAMUSCULAR; INTRAVENOUS at 13:48

## 2022-03-16 RX ADMIN — BUPIVACAINE HYDROCHLORIDE 3 ML: 2.5 INJECTION, SOLUTION EPIDURAL; INFILTRATION; INTRACAUDAL at 15:07

## 2022-03-16 RX ADMIN — FENTANYL CITRATE 50 MCG: 50 INJECTION, SOLUTION INTRAMUSCULAR; INTRAVENOUS at 15:44

## 2022-03-16 ASSESSMENT — ACTIVITIES OF DAILY LIVING (ADL)
DRESSING/BATHING_DIFFICULTY: NO
ADLS_ACUITY_SCORE: 14
ADLS_ACUITY_SCORE: 12
DOING_ERRANDS_INDEPENDENTLY_DIFFICULTY: NO
CHANGE_IN_FUNCTIONAL_STATUS_SINCE_ONSET_OF_CURRENT_ILLNESS/INJURY: YES
ADLS_ACUITY_SCORE: 14
ADLS_ACUITY_SCORE: 14
DIFFICULTY_EATING/SWALLOWING: NO
ADLS_ACUITY_SCORE: 16
FALL_HISTORY_WITHIN_LAST_SIX_MONTHS: YES
ADLS_ACUITY_SCORE: 14
NUMBER_OF_TIMES_PATIENT_HAS_FALLEN_WITHIN_LAST_SIX_MONTHS: 1
CONCENTRATING,_REMEMBERING_OR_MAKING_DECISIONS_DIFFICULTY: NO
WEAR_GLASSES_OR_BLIND: YES
WALKING_OR_CLIMBING_STAIRS_DIFFICULTY: NO
ADLS_ACUITY_SCORE: 12
ADLS_ACUITY_SCORE: 14
TOILETING_ISSUES: NO
VISION_MANAGEMENT: GLASSES

## 2022-03-16 ASSESSMENT — ENCOUNTER SYMPTOMS
DYSURIA: 0
LIGHT-HEADEDNESS: 1
BACK PAIN: 1
DIAPHORESIS: 1
ABDOMINAL DISTENTION: 1
HEMATURIA: 0
ABDOMINAL PAIN: 1
CONSTIPATION: 1

## 2022-03-16 ASSESSMENT — LIFESTYLE VARIABLES: TOBACCO_USE: 1

## 2022-03-16 NOTE — ED PROVIDER NOTES
Emergency Department Midlevel Supervisory Note     I personally saw the patient and performed a substantive portion of the visit including all aspects of the medical decision making.    ED Course:  11:08 AM  Esa Olson PA-C staffed patient with me. I agree with their assessment and plan of management, and I will see the patient.  11:32 AM I met with the patient to introduce myself, gather additional history, perform my initial exam, and discuss the plan.     Brief HPI:     Adan Tan is a 61 year old male who presents for evaluation of abdominal distension. 2 days ago patient fell down a flight of stairs and landed on his left shoulder. He has had left sided rib discomfort at that time. He was seen in ED yesterday where displaced fracture of distal clavicle was seen on x-ray. Since yesterday, patient has had some abdominal distension and notes he developed abdominal cramping and worsening abdominal bloating this morning. He endorses difficulty taking a deep breath secondary to pain. Last bowel movement was 4 days ago. Patient additionally reports several episodes of lightheadedness and diaphoresis with standing from a seated position.    I, Jaimie Beach am serving as a scribe to document services personally performed by Dr. Clay, based on my observations and the provider's statements to me.   I, Dr. Clay, attest that Jaimie Beach was acting in a scribe capacity, has observed my performance of the services and has documented them in accordance with my direction.    Brief Physical Exam:  Constitutional:  Alert, in no acute distress  HENT:  Atraumatic, normocephalic  Respiratory:  Respirations even, unlabored, in no acute respiratory distress       MDM:  61-year-old male here for evaluation of abdominal bloating after a fall 2 days ago.  His initial triage vital signs did show that he is mildly hypotensive and tachycardic.  Trauma labs were obtained and hemoglobin is stable at 11, he does have a  mild leukocytosis.  CT of the chest abdomen and pelvis was performed and unfortunately shows several rib fractures, 6 to be exact, with at least one that is displaced with some air in the tissues.  He also has a large left-sided hemothorax which is probably what is causing his feeling of distention.  He was resuscitated here in the ED with IV fluids and his heart rate has normalized and blood pressure is stable around 115.  We had several conversations with general surgery regarding plan for management.  The concern with doing a chest tube 2 days out is that the blood is probably coagulated at this point and they suspect that he actually needs a VATS procedure.  They are going to take him emergently to the OR at this time with trauma surgery.       1. Hemothorax on left        Labs and Imaging:  Results for orders placed or performed during the hospital encounter of 03/16/22   CBC with platelets and differential   Result Value Ref Range    WBC Count 15.6 (H) 4.0 - 11.0 10e3/uL    RBC Count 3.32 (L) 4.40 - 5.90 10e6/uL    Hemoglobin 11.3 (L) 13.3 - 17.7 g/dL    Hematocrit 34.5 (L) 40.0 - 53.0 %     (H) 78 - 100 fL    MCH 34.0 (H) 26.5 - 33.0 pg    MCHC 32.8 31.5 - 36.5 g/dL    RDW 12.7 10.0 - 15.0 %    Platelet Count 231 150 - 450 10e3/uL    % Neutrophils 84 %    % Lymphocytes 6 %    % Monocytes 9 %    % Eosinophils 0 %    % Basophils 0 %    % Immature Granulocytes 1 %    NRBCs per 100 WBC 0 <1 /100    Absolute Neutrophils 13.2 (H) 1.6 - 8.3 10e3/uL    Absolute Lymphocytes 1.0 0.8 - 5.3 10e3/uL    Absolute Monocytes 1.4 (H) 0.0 - 1.3 10e3/uL    Absolute Eosinophils 0.0 0.0 - 0.7 10e3/uL    Absolute Basophils 0.0 0.0 - 0.2 10e3/uL    Absolute Immature Granulocytes 0.1 <=0.4 10e3/uL    Absolute NRBCs 0.0 10e3/uL   Creatinine POCT   Result Value Ref Range    Creatinine POCT 0.6 (L) 0.7 - 1.3 mg/dL    GFR, ESTIMATED POCT >60 >60 mL/min/1.73m2     I have reviewed the relevant laboratory and radiology  studies    Procedures:  Critical Care  Performed by: Jacki Clay MD  Authorized by: Jacki Clay MD  Total critical care time: 35 minutes  Critical care time was exclusive of separately billable procedures and treating other patients.  Critical care was necessary to treat or prevent imminent or life-threatening deterioration of the following conditions: traumatic hemothorax  Critical care was time spent personally by me on the following activities: development of treatment plan with patient or surrogate, discussions with consultants, examination of patient, evaluation of patient's response to treatment, obtaining history from patient or surrogate, ordering and performing treatments and interventions, ordering and review of laboratory studies, ordering and review of radiographic studies and re-evaluation of patient's condition, this excludes any separately billable procedures.          Jacki Clay MD  [facility] EMERGENCY DEPARTMENT  [address] [place] [id number]  [phone]       Jacki Clay MD  03/16/22 [id number]

## 2022-03-16 NOTE — ED TRIAGE NOTES
Pt arrives via ems as he has developed abd distention ad pressure and has not had a bm for 3 days.  Pt broke his left collarbone on Monday. He took oxycodone for the  First time at midnight last night.   Pt felt clammy and lightheaded at 745 am today-lasted for 10 mins and felt better once he laid down. Iv infusion--medics had a systolic of 90. bp now is 102/65 which is the norm for pt. Pt is still feeling distended but not dizzy.

## 2022-03-16 NOTE — ED PROVIDER NOTES
EMERGENCY DEPARTMENT ENCOUNTER      NAME: Adan Tan  AGE: 61 year old male  YOB: 1960  MRN: [id number]  EVALUATION DATE & TIME: No admission date for patient encounter.    PCP: System, Provider Not In    ED PROVIDER: Esa Olson PA-C      Chief Complaint   Patient presents with     abd distention         FINAL IMPRESSION:  1. Hemothorax on left          MEDICAL DECISION MAKING:    Pertinent Labs & Imaging studies reviewed. (See chart for details)  61 year old male presents to the Emergency Department for evaluation of abdominal bloating and some mild shortness of breath.    Patient was assessed while he was waiting in the lobby.  He was brought in by ambulance but we did not have beds therefore he was triaged and waited in the lobby until we can get him back.  As I monitored his vital signs he did show tachycardia and some relatively low blood pressures.  Patient did have liter of normal saline ordered at this time to hopefully correct these slightly abnormal vitals.  Because of the trauma I did assess the patient on the lobby.  Based on his symptoms I did decide to assess with CT scan chest abdomen pelvis to make sure that there is no evidence of solid organ injury.    When the CT did return there was a very obvious large left-sided hemothorax with multiple rib fractures.  CT was read as negative for intra-abdominal trauma.  At this point time I did discuss the case with on-call trauma surgery surgeon, Dr. Torres.  He personally reviewed the images as well as came to the emergency department to assess the patient.  Because the patient is 2 days out from his trauma general surgery team is recommending that he be brought to the operating room for Pleur-evacs.  If we place a chest tube.  Is likely that the blood will be fairly clotted and will not evacuate, this seems like this is the best option for the patient and likely will be most comfortable to be under general anesthesia.      Patient was  discussed with attending physician, Dr. Lozoya 1 we were able to view the images that showed the hemothorax.    Plan to discuss case with admitting team as well.    60 minutes of critical care time     ED COURSE    10:18 AM I met with the patient, obtained history, performed an initial exam, and discussed options and plan for diagnostics and treatment here in the ED.  11:06 AM Staffed patient with Dr. Clay.   11:12 AM Checked in on and updated patient.   11:20 AM Spoke with general surgeon, Dr. Torres.   11:25 AM Spoke with radiologist.   11:46 AM Spoke with general surgeon, Dr. Torres. Patient will go to surgery around 1:00 PM.   12:52 PM Spoke with the hospitalist, Dr. Herrera.      At the conclusion of the encounter I discussed the results of all of the tests and the disposition. The questions were answered. The patient or family acknowledged understanding and was agreeable with the care plan.     MEDICATIONS GIVEN IN THE EMERGENCY:  Medications   ceFAZolin Sodium (ANCEF) injection 2 g (has no administration in time range)   ceFAZolin Sodium (ANCEF) injection 2 g (has no administration in time range)   lidocaine 1 % 0.1-1 mL (has no administration in time range)   lidocaine (LMX4) cream (has no administration in time range)   sodium chloride (PF) 0.9% PF flush 3 mL (has no administration in time range)   sodium chloride (PF) 0.9% PF flush 3 mL (has no administration in time range)   lactated ringers infusion (has no administration in time range)   magnesium sulfate 4 g in 50 mL sterile water (premade) (has no administration in time range)   acetaminophen (TYLENOL) tablet 975 mg (has no administration in time range)   oxyCODONE (ROXICODONE) tablet 5 mg (has no administration in time range)   naloxone (NARCAN) injection 0.2 mg (has no administration in time range)     Or   naloxone (NARCAN) injection 0.4 mg (has no administration in time range)     Or   naloxone (NARCAN) injection 0.2 mg (has no administration in  time range)     Or   naloxone (NARCAN) injection 0.4 mg (has no administration in time range)   sterile talc (STERITALC) powder for sclerosing 4 g (has no administration in time range)   0.9% sodium chloride BOLUS (1,000 mLs Intravenous New Bag 3/16/22 1035)   iopamidol (ISOVUE-370) solution 100 mL (100 mLs Intravenous Given 3/16/22 1048)       NEW PRESCRIPTIONS STARTED AT TODAY'S ER VISIT  There are no discharge medications for this patient.           =================================================================    HPI    Patient information was obtained from: patient     Use of Interpretor: N/A       Adan Tan is a 61 year old male with no pertinent history who presents to this ED by EMS for evaluation of abdominal distention.     On 3/14/2022 (2 days ago), patient fell down a flight of stairs, landing on his left shoulder. He was also feeling left sided rib discomfort at this time. Yesterday, patient presented to the ED and had an X-ray which showed displaced fracture of the distal clavicle with associated AC separation. Patient was offered a prescription for stronger pain medication, but he reported having oxycodone left over from a previous hip replacement that he never used. He was discharged with a sling and the intention of going to [facility] the next day.     On 3/14/2022 (2 days ago), patient was not feeling any abdominal discomfort. Yesterday, patient was feeling some abdominal distension but he was passing gas, so he was not as concerned. Last night, patient was feeling some generalized pain from his fall, so he decided to take an oxycodone before bed. Today, patient was in the car running errands with a friend and was feeling abdominal cramping and bloating to the point where he felt like it was difficult to take a deep breath due to the pain. He has also experienced a couple episodes of lightheadedness and diaphoresis with standing from a seated position. He notes that  he has not had a bowel movement since 3/12/2022 (4 days ago).     Patient was anticoagulated after his hip surgery but has stopped taking these medications since his 6 week follow-up was normal. Patient denies dysuria, hematuria, or any other complaints at this time.     REVIEW OF SYSTEMS   Review of Systems   Constitutional: Positive for diaphoresis.   Respiratory:        Positive for difficulty taking a deep breath (secondary to pain).   Gastrointestinal: Positive for abdominal distention, abdominal pain (cramping) and constipation.   Genitourinary: Negative for dysuria and hematuria.   Musculoskeletal: Positive for back pain.        Positive for left rib pain.    Neurological: Positive for light-headedness.   All other systems reviewed and are negative.        PAST MEDICAL HISTORY:  Past Medical History:   Diagnosis Date     Osteoarthritis        PAST SURGICAL HISTORY:  Past Surgical History:   Procedure Laterality Date     KNEE SURGERY Right     arthroscopy     TOTAL HIP ARTHROPLASTY Bilateral      TOTAL KNEE ARTHROPLASTY Right          CURRENT MEDICATIONS:      Current Facility-Administered Medications:      acetaminophen (TYLENOL) tablet 975 mg, 975 mg, Oral, Once, Jony Ervin MD     ceFAZolin Sodium (ANCEF) injection 2 g, 2 g, Intravenous, Pre-Op/Pre-procedure x 1 dose, River Torres, DO     ceFAZolin Sodium (ANCEF) injection 2 g, 2 g, Intravenous, See Admin Instructions, River Torres, DO     lactated ringers infusion, , Intravenous, Continuous, Jony Ervin MD     lidocaine (LMX4) cream, , Topical, Q1H PRN, Jony Ervin MD     lidocaine 1 % 0.1-1 mL, 0.1-1 mL, Other, Q1H PRN, Jony Ervin MD     magnesium sulfate 4 g in 50 mL sterile water (premade), 4 g, Intravenous, Once, Jony Ervin MD     naloxone (NARCAN) injection 0.2 mg, 0.2 mg, Intravenous, Q2 Min PRN **OR** naloxone (NARCAN) injection 0.4 mg, 0.4 mg, Intravenous, Q2 Min PRN **OR** naloxone (NARCAN) injection  "0.2 mg, 0.2 mg, Intramuscular, Q2 Min PRN **OR** naloxone (NARCAN) injection 0.4 mg, 0.4 mg, Intramuscular, Q2 Min PRN, River Torres DO     oxyCODONE (ROXICODONE) tablet 5 mg, 5 mg, Oral, Q4H PRN, Jony Ervin MD     sodium chloride (PF) 0.9% PF flush 3 mL, 3 mL, Intracatheter, Q8H, Jony Ervin MD     sodium chloride (PF) 0.9% PF flush 3 mL, 3 mL, Intracatheter, q1 min prn, Jony Ervin MD     sterile talc (STERITALC) powder for sclerosing 4 g, 4 g, INTRAPLEURAL, Once, River Torres DO      ALLERGIES:  No Known Allergies    FAMILY HISTORY:  History reviewed. No pertinent family history.    SOCIAL HISTORY:   Social History     Socioeconomic History     Marital status:      Spouse name: Not on file     Number of children: Not on file     Years of education: Not on file     Highest education level: Not on file   Occupational History     Not on file   Tobacco Use     Smoking status: Not on file     Smokeless tobacco: Not on file   Substance and Sexual Activity     Alcohol use: Not on file     Drug use: Not on file     Sexual activity: Not on file   Other Topics Concern     Not on file   Social History Narrative     Not on file     Social Determinants of Health     Financial Resource Strain: Not on file   Food Insecurity: Not on file   Transportation Needs: Not on file   Physical Activity: Not on file   Stress: Not on file   Social Connections: Not on file   Intimate Partner Violence: Not on file   Housing Stability: Not on file       VITALS:  Patient Vitals for the past 24 hrs:   BP Temp Temp src Pulse Resp SpO2 Height Weight   03/16/22 1243 -- 97.6  F (36.4  C) Oral -- -- -- -- --   03/16/22 1215 123/84 -- -- 103 21 94 % -- --   03/16/22 1200 114/81 -- -- 100 18 93 % -- --   03/16/22 0954 102/65 (!) 96.5  F (35.8  C) Tympanic 111 12 97 % 1.778 m (5' 10\") 81.6 kg (180 lb)       PHYSICAL EXAM    Physical Exam  Vitals and nursing note reviewed.   Constitutional:       General: He is " not in acute distress.     Appearance: He is normal weight. He is not ill-appearing or toxic-appearing.   Eyes:      Extraocular Movements: Extraocular movements intact.      Conjunctiva/sclera: Conjunctivae normal.   Cardiovascular:      Rate and Rhythm: Tachycardia present.      Pulses: Normal pulses.   Pulmonary:      Effort: Pulmonary effort is normal. No respiratory distress.      Breath sounds: No wheezing.      Comments: Diminished breath sounds in the left lung field  Abdominal:      General: Bowel sounds are normal. There is no distension.      Tenderness: There is abdominal tenderness. There is no guarding or rebound.   Musculoskeletal:         General: Deformity and signs of injury present. Normal range of motion.      Cervical back: Normal range of motion.      Comments: Tenderness over the left clavicle and left shoulder area, patient currently in sling.  No concern for new trauma remainder extremity exam is benign.   Skin:     General: Skin is warm and dry.   Neurological:      General: No focal deficit present.      Mental Status: He is alert. Mental status is at baseline.      Sensory: No sensory deficit.      Motor: No weakness.   Psychiatric:         Mood and Affect: Mood normal.          LAB:  All pertinent labs reviewed and interpreted.  Results for orders placed or performed during the hospital encounter of 03/16/22   CT Chest/Abdomen/Pelvis w Contrast    Impression    IMPRESSION:    1.  Multiple displaced left rib fractures including ribs 2, 3, 4, and 8 through 12. The posterior 10th rib is moderately displaced.  2.  Large left hemothorax and small left pneumothorax related to #1.  3.  Compression of the left lung secondary to #2 with multisegment collapse of the basilar segments of the left lower lobe.   Comprehensive metabolic panel   Result Value Ref Range    Sodium 137 136 - 145 mmol/L    Potassium 3.7 3.5 - 5.0 mmol/L    Chloride 103 98 - 107 mmol/L    Carbon Dioxide (CO2) 24 22 - 31  mmol/L    Anion Gap 10 5 - 18 mmol/L    Urea Nitrogen 14 8 - 22 mg/dL    Creatinine 0.72 0.70 - 1.30 mg/dL    Calcium 8.5 8.5 - 10.5 mg/dL    Glucose 168 (H) 70 - 125 mg/dL    Alkaline Phosphatase 56 45 - 120 U/L    AST 22 0 - 40 U/L    ALT 13 0 - 45 U/L    Protein Total 6.0 6.0 - 8.0 g/dL    Albumin 3.5 3.5 - 5.0 g/dL    Bilirubin Total 0.9 0.0 - 1.0 mg/dL    GFR Estimate >90 >60 mL/min/1.73m2   CBC with platelets and differential   Result Value Ref Range    WBC Count 15.6 (H) 4.0 - 11.0 10e3/uL    RBC Count 3.32 (L) 4.40 - 5.90 10e6/uL    Hemoglobin 11.3 (L) 13.3 - 17.7 g/dL    Hematocrit 34.5 (L) 40.0 - 53.0 %     (H) 78 - 100 fL    MCH 34.0 (H) 26.5 - 33.0 pg    MCHC 32.8 31.5 - 36.5 g/dL    RDW 12.7 10.0 - 15.0 %    Platelet Count 231 150 - 450 10e3/uL    % Neutrophils 84 %    % Lymphocytes 6 %    % Monocytes 9 %    % Eosinophils 0 %    % Basophils 0 %    % Immature Granulocytes 1 %    NRBCs per 100 WBC 0 <1 /100    Absolute Neutrophils 13.2 (H) 1.6 - 8.3 10e3/uL    Absolute Lymphocytes 1.0 0.8 - 5.3 10e3/uL    Absolute Monocytes 1.4 (H) 0.0 - 1.3 10e3/uL    Absolute Eosinophils 0.0 0.0 - 0.7 10e3/uL    Absolute Basophils 0.0 0.0 - 0.2 10e3/uL    Absolute Immature Granulocytes 0.1 <=0.4 10e3/uL    Absolute NRBCs 0.0 10e3/uL   Creatinine POCT   Result Value Ref Range    Creatinine POCT 0.6 (L) 0.7 - 1.3 mg/dL    GFR, ESTIMATED POCT >60 >60 mL/min/1.73m2   Asymptomatic COVID-19 Virus (Coronavirus) by PCR Nasopharyngeal    Specimen: Nasopharyngeal; Swab   Result Value Ref Range    SARS CoV2 PCR Negative Negative       RADIOLOGY:  Reviewed all pertinent imaging. Please see official radiology report.  CT Chest/Abdomen/Pelvis w Contrast   Final Result   IMPRESSION:      1.  Multiple displaced left rib fractures including ribs 2, 3, 4, and 8 through 12. The posterior 10th rib is moderately displaced.   2.  Large left hemothorax and small left pneumothorax related to #1.   3.  Compression of the left lung  secondary to #2 with multisegment collapse of the basilar segments of the left lower lobe.              I, Ninfa Isaac, am serving as a scribe to document services personally performed by Esa Olson PA-C based on my observation and the provider's statements to me. I, Esa Olson PA-C attest that Ninfa Isaac is acting in a scribe capacity, has observed my performance of the services and has documented them in accordance with my direction.    Esa Olson PA-C  Emergency Medicine  [facility]     Esa Olson PA-C  03/16/22 1257

## 2022-03-16 NOTE — PHARMACY-ADMISSION MEDICATION HISTORY
Pharmacy Note - Admission Medication History    Pertinent Provider Information: None   ______________________________________________________________________    Prior To Admission (PTA) med list completed and updated in EMR.       No outpatient medications have been marked as taking for the 3/16/22 encounter (Hospital Encounter).       Information source(s): Patient, Hospital records and [facility]    Method of interview communication: N/A (via RN)    Patient was asked about OTC/herbal products specifically.  PTA med list reflects this.    Based on the pharmacist's assessment, the PTA med list information appears reliable    Allergies were reviewed, assessed, and updated with the patient.      Patient does not use any multi-dose medications prior to admission.     Thank you for the opportunity to participate in the care of this patient.      Melissa Coffey [facility]     3/16/2022     1:46 PM

## 2022-03-16 NOTE — ANESTHESIA PROCEDURE NOTES
Epidural catheter Procedure Note    Pre-Procedure   Staff -        Anesthesiologist:  Jony Ervin MD       Performed By: anesthesiologist       Location: pre-op       Procedure Start/Stop Times: 3/16/2022 1:26 PM and 3/16/2022 1:40 PM       Pre-Anesthestic Checklist: patient identified, IV checked, risks and benefits discussed, informed consent, monitors and equipment checked, pre-op evaluation, at physician/surgeon's request and post-op pain management  Timeout:       Correct Patient: Yes        Correct Procedure: Yes        Correct Site: Yes        Correct Position: Yes   Procedure Documentation  Procedure: epidural catheter       Patient Position: sitting       Patient Prep/Sterile Barriers: sterile gloves, mask, patient draped       Skin prep: Chloraprep       Local skin infiltrated with 3 mL of 1% lidocaine.        Insertion Site: T7-8. (midline approach).       Technique: LORT saline        CATRINA at 8 cm.       Needle Type: NumberPicture       Needle Gauge: 18.        Needle Length (Inches): 3.5         Catheter threaded easily.         3 cm epidural space.         Threaded 11 cm at skin.         # of attempts: 2 and  # of redirects:  0    Assessment/Narrative         Paresthesias: No.       Test dose of 3 mL lidocaine 1.5% w/ 1:200,000 epinephrine at 13:38 CDT.         Test dose negative, 3 minutes after injection, for signs of intravascular, subdural, or intrathecal injection.       Insertion/Infusion Method: LORT saline       No aspiration negative for Heme or CSF via Epidural Catheter.

## 2022-03-16 NOTE — ANESTHESIA PREPROCEDURE EVALUATION
Anesthesia Pre-Procedure Evaluation    Patient: Adan Tan   MRN: [id number] : 1960        Procedure : Procedure(s):  VIDEO-ASSISTED THORACOSCOPIC SURGERY (VATS)          History reviewed. No pertinent past medical history.   History reviewed. No pertinent surgical history.   No Known Allergies   Social History     Tobacco Use     Smoking status: Not on file     Smokeless tobacco: Not on file   Substance Use Topics     Alcohol use: Not on file      Wt Readings from Last 1 Encounters:   22 81.6 kg (180 lb)        Anesthesia Evaluation   Pt has had prior anesthetic.     No history of anesthetic complications       ROS/MED HX  ENT/Pulmonary: Comment: Left hemothorax, multiple rib fractures and clavicle fracture s/p fall down stairs.    (+) tobacco use, Past use,     Neurologic:  - neg neurologic ROS     Cardiovascular:  - neg cardiovascular ROS     METS/Exercise Tolerance: >4 METS    Hematologic:  - neg hematologic  ROS     Musculoskeletal:   (+) arthritis,     GI/Hepatic:  - neg GI/hepatic ROS     Renal/Genitourinary:  - neg Renal ROS     Endo: Comment: ED - neg endo ROS     Psychiatric/Substance Use:       Infectious Disease:       Malignancy:       Other:            Physical Exam    Airway        Mallampati: I   TM distance: > 3 FB   Neck ROM: full   Mouth opening: > 3 cm    Respiratory Devices and Support         Dental         B=Bridge, C=Chipped, L=Loose, M=Missing    Cardiovascular   cardiovascular exam normal          Pulmonary   pulmonary exam normal                OUTSIDE LABS:  CBC:   Lab Results   Component Value Date    WBC 15.6 (H) 2022    HGB 11.3 (L) 2022    HCT 34.5 (L) 2022     2022     BMP:   Lab Results   Component Value Date     2022    POTASSIUM 3.7 2022    CHLORIDE 103 2022    CO2 24 2022    BUN 14 2022    CR 0.6 (L) 2022    CR 0.72 2022     (H) 2022     COAGS: No results found for: PTT,  INR, FIBR  POC: No results found for: BGM, HCG, HCGS  HEPATIC:   Lab Results   Component Value Date    ALBUMIN 3.5 03/16/2022    PROTTOTAL 6.0 03/16/2022    ALT 13 03/16/2022    AST 22 03/16/2022    ALKPHOS 56 03/16/2022    BILITOTAL 0.9 03/16/2022     OTHER:   Lab Results   Component Value Date    MORTEZA 8.5 03/16/2022       Anesthesia Plan    ASA Status:  2, emergent    NPO status:: small amount of orange juice at 0900.  No solids for greater than 8 hours.   Anesthesia Type: General.     - Airway: ETT   Induction: Intravenous, Propofol.   Maintenance: Balanced.   Techniques and Equipment:     - Airway: Video-Laryngoscope     - Lines/Monitors: 2nd IV     - Blood: T&S     Consents    Anesthesia Plan(s) and associated risks, benefits, and realistic alternatives discussed. Questions answered and patient/representative(s) expressed understanding.     - Discussed: Risks, Benefits and Alternatives for BOTH SEDATION and the PROCEDURE were discussed     - Discussed with:  Patient      - Extended Intubation/Ventilatory Support Discussed: No.      - Patient is DNR/DNI Status: No    Use of blood products discussed: Yes.     - Discussed with: Patient.     - Consented: consented to blood products            Reason for refusal: other.     Postoperative Care    Pain management: IV analgesics, Multi-modal analgesia, Postop Epidural.   PONV prophylaxis: Ondansetron (or other 5HT-3), Dexamethasone or Solumedrol, Background Propofol Infusion     Comments:    Other Comments: Pt consents to epidural for post operative pain control per surgeon's request.      Left DL ETT, 39 Fr.    Ketamine, robinul  Magnesium  Decadron 10 mg  Background propofol    PIV x 2  Invasive monitoring prn    Reverse with Sugammadex  Maryse Brar MD

## 2022-03-16 NOTE — OP NOTE
Name:  Adan Vasquezcharles  PCP:  System, Provider Not In  Procedure Date:  3/16/2022      Procedure(s):  VIDEO-ASSISTED THORACOSCOPIC SURGERY (VATS) EVACUATION OF HEMOTHORAX    Pre-Procedure Diagnosis:  Hemothorax on left [J94.2]     Post-Procedure Diagnosis:    Left-sided hemothorax    Surgeon(s):  River Torres DO    Circulator: Hai Wolfe RN; Leatha Thorpe RN  Scrub Person: Anayeli Juan    Anesthesia Type:  GET      Findings:  Approximately 1950 cc of old blood in the left thoracic cavity with evidence of significant blood clot burden  -Site of displaced rib fractures in the left posterior rib cage with protrusion into chest cavity    Operative Report:    The patient was in the operating room placed in supine position.  A dual lumen endotracheal tube was used for intubation.  Once complete the patient was then placed in the right lateral decubitus position.  The left chest was prepped and draped sterile fashion.  An axillary roll was placed.  I measured the mid axillary line at the inferior mammary fold and made a 10 mm transverse incision above the rib.  I then gained entry into the chest by using blunt dissection electrocautery.  I skirted over the rib and placed a 5 mm trocar with a 5 mm 30 degree camera into the chest cavity.  I immediately encountered evidence of old blood.  I placed an additional 5 mm trocar under direct visualization anterior to this previously placed trocar.  I then upsized the medial 5 mm trocar for a 11 mm trocar.  I then used suction irrigation to remove approximately 2 L of old blood and blood clot.  Several liters of normal saline were used to irrigate the left chest once the blood was removed.  There was a site of the likely source of the bleeding from an intercostal artery in the left posterior chest wall which was evaluated.  There is no evidence of any further bleeding.  Once complete I then placed a 28 [place] Islander right ankle chest tube under direct  visualization via the medial 11 millimeter port site.  This was sutured to the skin with a drain stitch.  I then removed the 5 mm trocar and placed a 28 [place]ian straight chest tube into the apical region of the left chest.  This was sutured in place as well.  Both chest tubes were connected to a Y connector and then connected to a Pleur-evac.  The wounds were then cleaned and covered with Xeroform and dry sterile dressing.  The patient was then placed back in a supine position and extubated.  He subsequently was sent to the PACU to undergo recovery.    Estimated Blood Loss:   0 (1950cc of old blood)    Specimens:    * No specimens in log *       Drains:   Chest Tube 1 Posterior Midaxillary 28 Bel[place]ian (Active)       Chest Tube 2 Anterior Midaxillary 28 [place]ian (Active)       Complications:    None    River Torres DO

## 2022-03-16 NOTE — ANESTHESIA POSTPROCEDURE EVALUATION
Patient: Adan Tan    Procedure: Procedure(s):  VIDEO-ASSISTED THORACOSCOPIC SURGERY (VATS) EVACUATION OF HEMOTHORAX       Anesthesia Type:  General    Note:  Disposition: Inpatient   Postop Pain Control: Uneventful            Sign Out: Well controlled pain   PONV: No   Neuro/Psych: Uneventful            Sign Out: Acceptable/Baseline neuro status   Airway/Respiratory: Uneventful            Sign Out: Acceptable/Baseline resp. status   CV/Hemodynamics: Uneventful            Sign Out: Acceptable CV status   Other NRE: NONE   DID A NON-ROUTINE EVENT OCCUR? No           Last vitals:  Vitals Value Taken Time   /77 03/16/22 1645   Temp 36.7  C (98.1  F) 03/16/22 1518   Pulse 93 03/16/22 1705   Resp 15 03/16/22 1615   SpO2 93 % 03/16/22 1705   Vitals shown include unvalidated device data.    Electronically Signed By: Topher Ortiz MD  March 16, 2022  5:55 PM

## 2022-03-16 NOTE — CONSULTS
HPI  61 year old year old male who I have been consulted by No ref. provider found for evaluation of abd distention  61-year-old male who fell down approximately 15 stairs 2 days ago and initially presented to the emergency room for evaluation.  At that time he was noted to have a left distal clavicular fracture after shoulder films were obtained.  He was subsequently sent home with orthopedic follow-up instructions.  Over the past 24 hours she developed worsening lightheadedness with shortness of breath as well as left-sided chest pain at over the ribs.  The pain became so severe that he elected to present back to the emergency room for evaluation.  During that time he underwent CT imaging which demonstrated fairly significant hemothorax on the left with a small pneumothorax.  He was tachycardic upon presentation today but underwent resuscitation and his heart rate has come down.  He feels significantly less lightheaded and fatigued at present.  He has had some mild difficulty breathing secondary to the pain.  He was noted to have a oxygen saturation of 90% at home on a pulse ox for which he borrowed from a friend.    Allergies:Patient has no known allergies.    History reviewed. No pertinent past medical history.    History reviewed. No pertinent surgical history.      CURRENT MEDS:  No current facility-administered medications for this encounter.  No current outpatient medications on file.    FAMHX-reviewed and noncontributory         Review of Systems:  The 12 point review of systems  is within normal limits except for as mentioned above in the HPI.  General ROS: No complaints or constitutional symptoms  Ophthalmic ROS: No complaints of visual changes  Skin: No complaints or symptoms   Endocrine: No complaints or symptoms  Hematologic/Lymphatic: No symptoms or complaints  Psychiatric: No symptoms or complaints  Respiratory ROS: no cough, shortness of breath, or wheezing  Cardiovascular ROS: no chest pain or  "dyspnea on exertion  Gastrointestinal ROS: As per HPI  Genito-Urinary ROS: no dysuria, trouble voiding, or hematuria  Musculoskeletal ROS: no joint or muscle pain  Neurological ROS: no TIA or stroke symptoms      EXAM:  /81   Pulse 100   Temp (!) 96.5  F (35.8  C) (Tympanic)   Resp 18   Ht 1.778 m (5' 10\")   Wt 81.6 kg (180 lb)   SpO2 93%   BMI 25.83 kg/m    GENERAL: Well developed male, No acute distress, pleasant and conversant   EYES: Pupils equal, round and reactive, no scleral icterus  CARDIAC: Regular rate and rhythm, no obvious murmurs noted  CHEST/LUNG: Clear to auscultation on the right lung fields.  Significantly diminished lung sounds on the left upper and lower lung field with tenderness palpation  ABDOMEN: Soft, nontender  SKIN: Pink, warm and dry, no obvious rashes or lesions   NEURO:No focal deficits, ambulatory  MUSCULOSKELETAL:No obvious deformities, no swelling, normal appearing      LABS:  Lab Results   Component Value Date    WBC 15.6 03/16/2022    HGB 11.3 03/16/2022    HCT 34.5 03/16/2022     03/16/2022     03/16/2022     INR/Prothrombin Time  Recent Labs   Lab 03/16/22  1036      CO2 24   BUN 14     Lab Results   Component Value Date    ALT 13 03/16/2022    AST 22 03/16/2022    ALKPHOS 56 03/16/2022       IMAGES:   Relevant images were reviewed and discussed with the patient.  Notable findings were: CT images reviewed which demonstrate multiple rib fractures on the left including the second third and fourth as well as the eighth, ninth, 10th, 11th and 12th.  There is significant hemothorax with significant lung atelectasis secondary to compression    Assessment/Plan:   Adan Tan is a 61 year old male with multiple left-sided rib fractures as well as a large hemopneumothorax.  The amount of blood in his chest is fairly extensive and I am concerned about future fibrothorax.  I am not convinced a chest tube or to would be sufficient to evacuate the clot.  " Having said that, we will plan for a VATS procedure with washout.  I explained this to the patient including risks and benefits versus nonoperative management strategies.  He understands having it was discussed and has consented to proceed.      Joe Torres D.O. [facility]  [phone]

## 2022-03-17 ENCOUNTER — APPOINTMENT (OUTPATIENT)
Dept: OCCUPATIONAL THERAPY | Facility: HOSPITAL | Age: 62
End: 2022-03-17
Attending: PHYSICIAN ASSISTANT
Payer: COMMERCIAL

## 2022-03-17 ENCOUNTER — APPOINTMENT (OUTPATIENT)
Dept: RADIOLOGY | Facility: HOSPITAL | Age: 62
End: 2022-03-17
Attending: SURGERY
Payer: COMMERCIAL

## 2022-03-17 LAB
ALBUMIN SERPL-MCNC: 3.1 G/DL (ref 3.5–5)
ALP SERPL-CCNC: 41 U/L (ref 45–120)
ALT SERPL W P-5'-P-CCNC: 9 U/L (ref 0–45)
ANION GAP SERPL CALCULATED.3IONS-SCNC: 6 MMOL/L (ref 5–18)
AST SERPL W P-5'-P-CCNC: 18 U/L (ref 0–40)
BASOPHILS # BLD AUTO: 0 10E3/UL (ref 0–0.2)
BASOPHILS NFR BLD AUTO: 0 %
BILIRUB SERPL-MCNC: 0.6 MG/DL (ref 0–1)
BUN SERPL-MCNC: 7 MG/DL (ref 8–22)
CALCIUM SERPL-MCNC: 7.9 MG/DL (ref 8.5–10.5)
CHLORIDE BLD-SCNC: 106 MMOL/L (ref 98–107)
CO2 SERPL-SCNC: 27 MMOL/L (ref 22–31)
CREAT SERPL-MCNC: 0.59 MG/DL (ref 0.7–1.3)
EOSINOPHIL # BLD AUTO: 0 10E3/UL (ref 0–0.7)
EOSINOPHIL NFR BLD AUTO: 0 %
ERYTHROCYTE [DISTWIDTH] IN BLOOD BY AUTOMATED COUNT: 12.6 % (ref 10–15)
GFR SERPL CREATININE-BSD FRML MDRD: >90 ML/MIN/1.73M2
GLUCOSE BLD-MCNC: 121 MG/DL (ref 70–125)
GLUCOSE BLDC GLUCOMTR-MCNC: 116 MG/DL (ref 70–99)
HCT VFR BLD AUTO: 26.1 % (ref 40–53)
HGB BLD-MCNC: 8.6 G/DL (ref 13.3–17.7)
IMM GRANULOCYTES # BLD: 0.1 10E3/UL
IMM GRANULOCYTES NFR BLD: 1 %
LYMPHOCYTES # BLD AUTO: 0.7 10E3/UL (ref 0.8–5.3)
LYMPHOCYTES NFR BLD AUTO: 8 %
MCH RBC QN AUTO: 34.3 PG (ref 26.5–33)
MCHC RBC AUTO-ENTMCNC: 33 G/DL (ref 31.5–36.5)
MCV RBC AUTO: 104 FL (ref 78–100)
MONOCYTES # BLD AUTO: 0.8 10E3/UL (ref 0–1.3)
MONOCYTES NFR BLD AUTO: 10 %
NEUTROPHILS # BLD AUTO: 6.7 10E3/UL (ref 1.6–8.3)
NEUTROPHILS NFR BLD AUTO: 81 %
NRBC # BLD AUTO: 0 10E3/UL
NRBC BLD AUTO-RTO: 0 /100
PLATELET # BLD AUTO: 168 10E3/UL (ref 150–450)
POTASSIUM BLD-SCNC: 4.3 MMOL/L (ref 3.5–5)
PROT SERPL-MCNC: 5.2 G/DL (ref 6–8)
RBC # BLD AUTO: 2.51 10E6/UL (ref 4.4–5.9)
SODIUM SERPL-SCNC: 139 MMOL/L (ref 136–145)
WBC # BLD AUTO: 8.2 10E3/UL (ref 4–11)

## 2022-03-17 PROCEDURE — 99231 SBSQ HOSP IP/OBS SF/LOW 25: CPT | Performed by: PHYSICIAN ASSISTANT

## 2022-03-17 PROCEDURE — 97166 OT EVAL MOD COMPLEX 45 MIN: CPT | Mod: GO

## 2022-03-17 PROCEDURE — 80053 COMPREHEN METABOLIC PANEL: CPT | Performed by: STUDENT IN AN ORGANIZED HEALTH CARE EDUCATION/TRAINING PROGRAM

## 2022-03-17 PROCEDURE — 258N000003 HC RX IP 258 OP 636: Performed by: SURGERY

## 2022-03-17 PROCEDURE — 250N000011 HC RX IP 250 OP 636: Performed by: SURGERY

## 2022-03-17 PROCEDURE — 250N000013 HC RX MED GY IP 250 OP 250 PS 637: Performed by: SURGERY

## 2022-03-17 PROCEDURE — 120N000001 HC R&B MED SURG/OB

## 2022-03-17 PROCEDURE — 85025 COMPLETE CBC W/AUTO DIFF WBC: CPT | Performed by: STUDENT IN AN ORGANIZED HEALTH CARE EDUCATION/TRAINING PROGRAM

## 2022-03-17 PROCEDURE — 71045 X-RAY EXAM CHEST 1 VIEW: CPT

## 2022-03-17 PROCEDURE — 97535 SELF CARE MNGMENT TRAINING: CPT | Mod: GO

## 2022-03-17 PROCEDURE — 99233 SBSQ HOSP IP/OBS HIGH 50: CPT | Performed by: HOSPITALIST

## 2022-03-17 PROCEDURE — 36415 COLL VENOUS BLD VENIPUNCTURE: CPT | Performed by: STUDENT IN AN ORGANIZED HEALTH CARE EDUCATION/TRAINING PROGRAM

## 2022-03-17 RX ADMIN — HYDROMORPHONE HYDROCHLORIDE 0.2 MG: 0.2 INJECTION, SOLUTION INTRAMUSCULAR; INTRAVENOUS; SUBCUTANEOUS at 22:57

## 2022-03-17 RX ADMIN — DOCUSATE SODIUM 50 MG AND SENNOSIDES 8.6 MG 1 TABLET: 8.6; 5 TABLET, FILM COATED ORAL at 09:18

## 2022-03-17 RX ADMIN — ACETAMINOPHEN 975 MG: 325 TABLET ORAL at 18:11

## 2022-03-17 RX ADMIN — SODIUM CHLORIDE: 9 INJECTION, SOLUTION INTRAVENOUS at 04:31

## 2022-03-17 RX ADMIN — DOCUSATE SODIUM 50 MG AND SENNOSIDES 8.6 MG 1 TABLET: 8.6; 5 TABLET, FILM COATED ORAL at 20:47

## 2022-03-17 RX ADMIN — POLYETHYLENE GLYCOL 3350 17 G: 17 POWDER, FOR SOLUTION ORAL at 09:18

## 2022-03-17 RX ADMIN — ACETAMINOPHEN 975 MG: 325 TABLET ORAL at 02:12

## 2022-03-17 RX ADMIN — HYDROMORPHONE HYDROCHLORIDE 0.2 MG: 0.2 INJECTION, SOLUTION INTRAMUSCULAR; INTRAVENOUS; SUBCUTANEOUS at 04:50

## 2022-03-17 RX ADMIN — HYDROMORPHONE HYDROCHLORIDE 0.4 MG: 0.2 INJECTION, SOLUTION INTRAMUSCULAR; INTRAVENOUS; SUBCUTANEOUS at 13:26

## 2022-03-17 RX ADMIN — ACETAMINOPHEN 975 MG: 325 TABLET ORAL at 09:17

## 2022-03-17 ASSESSMENT — ACTIVITIES OF DAILY LIVING (ADL)
ADLS_ACUITY_SCORE: 10
DEPENDENT_IADLS:: INDEPENDENT
ADLS_ACUITY_SCORE: 10
ADLS_ACUITY_SCORE: 14
ADLS_ACUITY_SCORE: 10
ADLS_ACUITY_SCORE: 14
ADLS_ACUITY_SCORE: 10
ADLS_ACUITY_SCORE: 14
ADLS_ACUITY_SCORE: 10
ADLS_ACUITY_SCORE: 14

## 2022-03-17 NOTE — PROGRESS NOTES
"ASSESSMENT:  1. Hemothorax on left        Adan Tan is a 61 year old male who is s/p VATS with chest placement x2 for hemothorax on 3/16, POD# 1   CXR today reviewed, no pneumo, chest tubes in good position    PLAN:  -Chest tubes to water seal today  -Increase activity as tolerated  -Daily CXR    Padilla Meade PA-C  Pager - [phone]  Phone - [phone]   General Surgery    SUBJECTIVE:   He is doing well, sore, no SOB    Patient Vitals for the past 24 hrs:   BP Temp Temp src Pulse Resp SpO2 Height Weight   03/17/22 0700 (!) 140/73 98.3  F (36.8  C) Oral 83 18 91 % -- --   03/17/22 0443 132/67 98.3  F (36.8  C) Oral 86 18 91 % -- --   03/16/22 2245 116/60 98.9  F (37.2  C) Oral 77 18 93 % -- --   03/16/22 2145 113/60 98.8  F (37.1  C) Oral 78 18 94 % -- --   03/16/22 2045 116/61 98.5  F (36.9  C) Oral 75 16 94 % -- --   03/16/22 1945 116/71 98.7  F (37.1  C) Oral 75 18 94 % -- --   03/16/22 1845 129/82 98.4  F (36.9  C) Oral 74 18 93 % -- --   03/16/22 1830 131/78 98.5  F (36.9  C) Oral 80 18 92 % -- --   03/16/22 1815 133/76 98.5  F (36.9  C) Oral 83 18 92 % -- --   03/16/22 1802 138/72 98.4  F (36.9  C) Oral 81 18 92 % -- --   03/16/22 1725 (!) 165/92 -- Oral 89 15 92 % 1.778 m (5' 10\") 84.5 kg (186 lb 3 oz)   03/16/22 1615 137/77 -- -- 81 15 94 % -- --   03/16/22 1600 (!) 143/80 -- -- 87 20 95 % -- --   03/16/22 1545 (!) 153/87 -- -- 95 26 96 % -- --   03/16/22 1530 (!) 155/86 -- -- 97 (!) 32 94 % -- --   03/16/22 1518 (!) 160/84 98.1  F (36.7  C) Temporal 100 (!) 36 93 % -- --   03/16/22 1243 -- 97.6  F (36.4  C) Oral -- -- -- -- --   03/16/22 1215 123/84 -- -- 103 21 94 % -- --   03/16/22 1200 114/81 -- -- 100 18 93 % -- --        PHYSICAL EXAM:  GEN: No acute distress, comfortable  CV:RRR  Chest: chest tube in place, no air leak, serosanguinous drainage  ABD:soft, nontender  EXT:No cyanosis, edema or obvious abnormalities    03/16 0700 - 03/17 0659  In: 3100 [P.O.:100; I.V.:500]  Out: 2000 " [Urine:1650]    Lab Results   Component Value Date    WBC 8.2 03/17/2022    HGB 8.6 03/17/2022    HCT 26.1 03/17/2022     03/17/2022     03/17/2022     INR/Prothrombin Time  Recent Labs   Lab 03/17/22  0631      CO2 27   BUN 7*     Lab Results   Component Value Date    ALT 9 03/17/2022    AST 18 03/17/2022    ALKPHOS 41 (L) 03/17/2022

## 2022-03-17 NOTE — PROGRESS NOTES
Occupational Therapy       03/17/22 1300   Quick Adds   Type of Visit Initial Occupational Therapy Evaluation   Living Environment   People in Home spouse  (spouse is RN)   Current Living Arrangements house   Home Accessibility stairs to enter home   Self-Care   Usual Activity Tolerance excellent   General Information   Onset of Illness/Injury or Date of Surgery 03/16/22   Referring Physician Padilla Meade PA-C   Patient/Family Therapy Goal Statement (OT) walk better   Left Upper Extremity (Weight-bearing Status)   (clavicular fracture, in sling)   General Observations and Info Patient seen in emergency department 3/14 following fall down 18 steps. MD identified left clavicular fracture. Patient placed in sling and had outpatient order to see orthopedics. Patient went home and had increased pain and shortness of breath. Patient returned to emergency department found to have left rib fractures 2, 3,4, 8,9,10,11, and 12. MD also identified pnuemothorax and hemothorax. VATS procedure completed with chest tube placement. Patient now on 2L nasal canal and has epidural.   Cognitive Status Examination   Orientation Status orientation to person, place and time   Bed Mobility   Bed Mobility supine-sit   Supine-Sit [place] (Bed Mobility) moderate assist (50% patient effort)  (HOB elevated, increased time and effort)   Transfers   Transfers sit-stand transfer   Sit-Stand Transfer   Sit-Stand [place] (Transfers) contact guard   Clinical Impression   Criteria for Skilled Therapeutic Interventions Met (OT) Yes, treatment indicated   OT Diagnosis Decreased ADL independence   Influenced by the following impairments 2-4, 8-12 rib fractures (L), clavicular fracture (L)   OT Problem List-Impairments impacting ADL problems related to;activity tolerance impaired;post-surgical precautions;strength   Assessment of Occupational Performance 3-5 Performance Deficits   Identified Performance Deficits Dressing, bed mob, trsfs    Planned Therapy Interventions (OT) ADL retraining   Clinical Decision Making Complexity (OT) moderate complexity   Risk & Benefits of therapy have been explained evaluation/treatment results reviewed   OT Discharge Planning   OT Discharge Recommendation (DC Rec) home with assist   Total Evaluation Time (Minutes)   Total Evaluation Time (Minutes) 15   OT Goals   Therapy Frequency (OT) Daily   OT Predicated Duration/Target Date for Goal Attainment 03/24/22   OT Goals Transfers;Toilet Transfer/Toileting;Upper Body Dressing   OT: Upper Body Dressing Minimal assist   OT: Transfer Minimal assist   OT: Toilet Transfer/Toileting Minimal assist

## 2022-03-17 NOTE — PROGRESS NOTES
Pt sitting up on the side of the bed. States pain has been well controlled when in bed, but c/o significant pain with sitting up. C/o spasms in anterior chest. Decreased cold sensation in chest bilaterally as compared to upper thigh. Epidural currently running at 6cc/hour (bupiv 0.125%). Will increase epidural rate to 7cc/hour. Pt was encouraged to use supplemental oral and IV analgesics with increased activity. Dressing CDI. POD1. Will continue to follow.

## 2022-03-17 NOTE — PLAN OF CARE
"  Problem: Plan of Care - These are the overarching goals to be used throughout the patient stay.    Goal: Plan of Care Review/Shift Note  Description: The Plan of Care Review/Shift note should be completed every shift.  The Outcome Evaluation is a brief statement about your assessment that the patient is improving, declining, or no change.  This information will be displayed automatically on your shift note.  Outcome: Ongoing, Progressing  Goal: Patient-Specific Goal (Individualized)  Description: You can add care plan individualizations to a care plan. Examples of Individualization might be:  \"Parent requests to be called daily at 9am for status\", \"I have a hard time hearing out of my right ear\", or \"Do not touch me to wake me up as it startles me\".  Outcome: Ongoing, Progressing  Goal: Absence of Hospital-Acquired Illness or Injury  Outcome: Ongoing, Progressing  Intervention: Identify and Manage Fall Risk  Recent Flowsheet Documentation  Taken 3/17/2022 0000 by Ember Roldan RN  Safety Promotion/Fall Prevention: activity supervised  Intervention: Prevent Skin Injury  Recent Flowsheet Documentation  Taken 3/17/2022 0000 by Ember Roldan RN  Body Position: position changed independently  Intervention: Prevent and Manage VTE (Venous Thromboembolism) Risk  Recent Flowsheet Documentation  Taken 3/17/2022 0000 by Ember Roldan, RN  Activity Management: activity adjusted per tolerance  Goal: Optimal Comfort and Wellbeing  Outcome: Ongoing, Progressing  Intervention: Monitor Pain and Promote Comfort  Recent Flowsheet Documentation  Taken 3/17/2022 0000 by Ember Roldan, RN  Pain Management Interventions: medication (see MAR)  Goal: Readiness for Transition of Care  Outcome: Ongoing, Progressing   Goal Outcome Evaluation:        Pt denies nausea. Left chest pain with inspiration 2/10. Pain did increase after pt moved around in bed. 0.2 iv dilaudid given x 1 with good relief. Oxygen temporarily " increased to 5L as pt breathing shallowly with increased pain. Able to wean back down to 3L. Chest tube drsg C/D/I. Epidural drsg C/D/I.

## 2022-03-17 NOTE — ANESTHESIA POST-OP FOLLOW-UP NOTE
Patient: Adan Tan    Procedure(s):  VIDEO-ASSISTED THORACOSCOPIC SURGERY (VATS) EVACUATION OF HEMOTHORAX    Anesthesia Type:  General    Patient having some pain. Site clean/dry/intact. Rate increased by 1mL/hr.

## 2022-03-17 NOTE — PLAN OF CARE
"Pt states minimal pain unless moving then pain rates\"4\" epideral site intact, CT sites intact.    Pt denies numbness able to use urinal and offers no complaints.  O2 at 2L with sats 94-96%                "

## 2022-03-17 NOTE — CONSULTS
Care Management Initial Consult    General Information  Assessment completed with: Patient, Spouse or significant other, patient and spouse  Type of CM/SW Visit: Initial Assessment    Primary Care Provider verified and updated as needed: Yes   Readmission within the last 30 days: no previous admission in last 30 days         Advance Care Planning: Advance Care Planning Reviewed: questions answered          Communication Assessment  Patient's communication style: spoken language (English or Bilingual)    Hearing Difficulty or Deaf: no   Wear Glasses or Blind: yes    Cognitive  Cognitive/Neuro/Behavioral: WDL                      Living Environment:   People in home: spouse, friend(s)     Current living Arrangements: house (staying with friends temporarily)      Able to return to prior arrangements: yes       Family/Social Support:  Care provided by:    Provides care for:    Marital Status:   Wife  Liz       Description of Support System: Supportive, Involved    Support Assessment: Adequate family and caregiver support, Adequate social supports    Current Resources:   Patient receiving home care services: No     Community Resources: None  Equipment currently used at home: none  Supplies currently used at home: None    Employment/Financial:  Employment Status: retired        Financial Concerns: other (see comments) (patient signing up for COBRA insurance plan)           Lifestyle & Psychosocial Needs:  Social Determinants of Health     Tobacco Use: Medium Risk     Smoking Tobacco Use: Former Smoker     Smokeless Tobacco Use: Never Used   Alcohol Use: Not on file   Financial Resource Strain: Not on file   Food Insecurity: Not on file   Transportation Needs: Not on file   Physical Activity: Not on file   Stress: Not on file   Social Connections: Not on file   Intimate Partner Violence: Not on file   Depression: Not on file   Housing Stability: Not on file       Functional Status:  Prior to admission patient  needed assistance:   Dependent ADLs:: Independent  Dependent IADLs:: Independent  Assesssment of Functional Status: Not at baseline with mobility, Not at baseline with ADL Functioning    Mental Health Status:  Mental Health Status: No Current Concerns       Chemical Dependency Status:  Chemical Dependency Status: No Current Concerns             Values/Beliefs:  Spiritual, Cultural Beliefs, Roman [employer] Practices, Values that affect care:                 Additional Information:  Chart review completed.    RNCM met with patient and his spouse, Liz, to introduce self and review role of care management, progression of care, and possible need for services at discharge.  Patient states that his spouse is a traveling nurse and they are in process of moving to her next assignment in [facility].  He states they were staying with friends when he had a fall down the stairs.  Patient is retired, independent and drives at baseline.  Offered and provided [employer] Directive [place].  Patient states he will complete while in hospital and if possible have notarized before discharge.    Informed that CM will continue to follow progression, review recommendations from care team and assist as needed.  Spouse will provide transport at discharge.      Melissa Bryson RN

## 2022-03-17 NOTE — PLAN OF CARE
"  Problem: Plan of Care - These are the overarching goals to be used throughout the patient stay.    Goal: Patient-Specific Goal (Individualized)  Description: You can add care plan individualizations to a care plan. Examples of Individualization might be:  \"Parent requests to be called daily at 9am for status\", \"I have a hard time hearing out of my right ear\", or \"Do not touch me to wake me up as it startles me\".  Outcome: Ongoing, Progressing     Problem: Plan of Care - These are the overarching goals to be used throughout the patient stay.    Goal: Optimal Comfort and Wellbeing  Outcome: Ongoing, Progressing   A/O x4. VSS. Pain controlled with epidural per pt. Chest tube at set at -20 and working ok. Pt calm and comfortable in bed.                       "

## 2022-03-17 NOTE — PROGRESS NOTES
.  Hospitalist Progress Note    Assessment/Plan    Active Problems:    Hemothorax on left    61 year old male presented to ED for evaluation of abdominal distention.  Patient had a fall accident 2 days ago when he fell down 15 flight of stairs.  Chest x-ray patient was found to have hemothorax, s/p chest tube placement by surgery.      Left hemothorax  -Patient sustained fall accident 2 days PTA by falling down 15 flight of stairs.  -He came on the day of fall and was found to have clavicular fracture and was sent home at that time. Pt returned with abd distension and work up with CT chest reported as multiple displaced left rib fractures including ribs 2, 3, 4, and 8 through 12. The posterior 10th rib is moderately displaced.Large left hemothorax and small left pneumothorax Compression of the left lung secondary with multisegment collapse of the basilar segments of the left lower lobe.  -S/p left chest tube placement, managed by surgery.  -Encourage IS       Arthritis of right hip, shoulder  -Pain management    Barriers to Discharge:  Chest tube, pain control    Anticipated discharge date/Disposition: 2-3 days    Subjective  Patient is new to me. Chart reviewed and events noted.  Patient seen and examined.   Discomfort at site of chest tube with movement. No abd pain. SOB better.    Objective    Vital signs in last 24 hours  Temp:  [98.1  F (36.7  C)-98.9  F (37.2  C)] 98.3  F (36.8  C)  Pulse:  [] 83  Resp:  [15-36] 18  BP: (113-165)/(60-92) 140/73  SpO2:  [91 %-96 %] 91 % [unfilled] O2 Device: Nasal cannula    Weight:   [unfilled] Weight change:     Intake/Output last 3 shifts  I/O last 3 completed shifts:  In: 3100 [P.O.:100; I.V.:500; Other:2000]  Out: 2000 [Urine:1650; Chest Tube:350]  Body mass index is 26.72 kg/m .    Physical Exam    General Appearance:    Alert, cooperative, no distress, appears stated age   Lungs:     Diminished BS, left chest tube in place   Cardiovascular:    RRR   Abdomen:      Soft   Neurologic:   Awake, alert, oriented, speech clear, moving all extremities     Pertinent Labs   Lab Results: personally reviewed.   Recent Labs   Lab 03/17/22  0631 03/16/22  1036    137   CO2 27 24   BUN 7* 14   ALBUMIN 3.1* 3.5   ALKPHOS 41* 56   ALT 9 13   AST 18 22     Recent Labs   Lab 03/17/22  0142 03/16/22  1036   WBC 8.2 15.6*   HGB 8.6* 11.3*   HCT 26.1* 34.5*    231     No results for input(s): CKTOTAL, TROPONINI in the last 168 hours.    Invalid input(s): TROPONINT, CKMBINDEX  Invalid input(s): POCGLUFGR    Medications  Drug and lactation database from the [place] National Library of Medicine:  http://toxnet.nlm.nih.gov/cgi-bin/sis/htmlgen?LACT      Pertinent Radiology   Radiology Results:  Personally reviewed impressions    Reviewed labs in last 24 hours.    Advanced Care Planning:  Discharge Planning discussed with patient  Total time with this patient is 35 min with greater than 50% of time spent in coordination of care.  Care discussed and coordinated with patient, RN, SW.    This Note is created using dragon voice recognition software.  Errors in spelling or words which seems out of context are unintentional.  Sounds alike errors may have escaped editing.    Lorin Peacock MD  Hospitalist

## 2022-03-17 NOTE — H&P
ADMISSION HISTORY & PHYSICAL        Patient YOB: 1960  Admit date: 3/16/2022  Date of Service: 3/16/2022    ASSESSMENT AND PLAN:  61 year old male presented to ED for evaluation of abdominal distention.  Patient had a fall accident 2 days ago when he fell down 15 flight of stairs.  Chest x-ray patient was found to have hemothorax.  Surgery placed chest tube today.    Left hemothorax  -Patient sustained fall accident 2 days ago by falling down 15 flight of stairs.  -He came to ED 2 days ago and was found to have clavicular fracture and was sent home at that time.  -Status post chest tube insertion by surgery team  -CT chest reported as Multiple displaced left rib fractures including ribs 2, 3, 4, and 8 through 12. The posterior 10th rib is moderately displaced.Large left hemothorax and small left pneumothorax Compression of the left lung secondary with multisegment collapse of the basilar segments of the left lower lobe.  -Surgery on board  -Has epidural catheter for pain management  -Consider pain medicine consult    Arthritis of right hip, shoulder  -Pain management      CODE STATUS:  Full Code    Disposition:  -Anticipated Length of Stay in midnights and medical necessity (including a midnight in the Emergency Department after triage if applicable): >2 days       CHIEF COMPLAINT:  Abdominal distention of 1 day duration    HISTORY OF PRESENTING ILLNESS:  Patient is a 61 year old male with PMH significant for arthritis of right hip and shoulder.  Patient came to ED after sustaining fall accident 2 days ago with 15 flight of stairs.  Patient was found to have clavicular fracture at the time and was sent home.  Patient returned with abdominal distention and further evaluation revealed hemopneumothorax.  Patient went to the OR directly and chest tube was inserted.    PMH/PSH:  Patient Active Problem List   Diagnosis     Arthritis of right hip     Arthritis of shoulder     Erectile dysfunction      Hemothorax on left       Past Medical History:   Diagnosis Date     Osteoarthritis         Past Surgical History:   Procedure Laterality Date     KNEE SURGERY Right     arthroscopy     TOTAL HIP ARTHROPLASTY Bilateral      TOTAL KNEE ARTHROPLASTY Right           ALLERGIES:  No Known Allergies    MEDICATIONS:  Reviewed.  Current Facility-Administered Medications   Medication     [START ON 3/19/2022] acetaminophen (TYLENOL) tablet 650 mg     acetaminophen (TYLENOL) tablet 975 mg     bisacodyl (DULCOLAX) Suppository 10 mg     bupivacaine (MARCAINE) 0.125 % in sodium chloride 0.9 % 250 mL EPIDURAL Infusion     HYDROmorphone (DILAUDID) injection 0.2 mg    Or     HYDROmorphone (DILAUDID) injection 0.4 mg     lidocaine (LMX4) cream     magnesium hydroxide (MILK OF MAGNESIA) suspension 30 mL     medication instruction     nalbuphine (NUBAIN) injection 2.5-5 mg     naloxone (NARCAN) injection 0.2 mg    Or     naloxone (NARCAN) injection 0.4 mg    Or     naloxone (NARCAN) injection 0.2 mg    Or     naloxone (NARCAN) injection 0.4 mg     ondansetron (ZOFRAN-ODT) ODT tab 4 mg    Or     ondansetron (ZOFRAN) injection 4 mg     oxyCODONE (ROXICODONE) tablet 5 mg    Or     oxyCODONE (ROXICODONE) tablet 10 mg     [START ON 3/17/2022] polyethylene glycol (MIRALAX) Packet 17 g     prochlorperazine (COMPAZINE) injection 10 mg    Or     prochlorperazine (COMPAZINE) tablet 10 mg     senna-docusate (SENOKOT-S/PERICOLACE) 8.6-50 MG per tablet 1 tablet     sodium chloride (PF) 0.9% PF flush 3 mL     sodium chloride (PF) 0.9% PF flush 3 mL     sodium chloride 0.9% (bottle) irrigation     sodium chloride 0.9% infusion     sterile talc (STERITALC) powder for sclerosing 4 g       Current Facility-Administered Medications:      [START ON 3/19/2022] acetaminophen (TYLENOL) tablet 650 mg, 650 mg, Oral, Q4H PRN, River Torres DO     acetaminophen (TYLENOL) tablet 975 mg, 975 mg, Oral, Q8H, River Torres, , 975 mg at 03/16/22  1948     bisacodyl (DULCOLAX) Suppository 10 mg, 10 mg, Rectal, Daily PRN, River Torres DO     bupivacaine (MARCAINE) 0.125 % in sodium chloride 0.9 % 250 mL EPIDURAL Infusion, , EPIDURAL, Continuous, Jony Ervin MD, Last Rate: 6 mL/hr at 03/16/22 1810, New Bag at 03/16/22 1810     HYDROmorphone (DILAUDID) injection 0.2 mg, 0.2 mg, Intravenous, Q2H PRN **OR** HYDROmorphone (DILAUDID) injection 0.4 mg, 0.4 mg, Intravenous, Q2H PRN, River Torres, , 0.4 mg at 03/16/22 1942     lidocaine (LMX4) cream, , Topical, Q1H PRN, River Torres DO     magnesium hydroxide (MILK OF MAGNESIA) suspension 30 mL, 30 mL, Oral, Daily PRN, River Torres DO     medication instruction, , Does not apply, Continuous PRN, Jony Ervin MD     nalbuphine (NUBAIN) injection 2.5-5 mg, 2.5-5 mg, Intravenous, Q6H PRN, Jony Ervin MD     naloxone (NARCAN) injection 0.2 mg, 0.2 mg, Intravenous, Q2 Min PRN **OR** naloxone (NARCAN) injection 0.4 mg, 0.4 mg, Intravenous, Q2 Min PRN **OR** naloxone (NARCAN) injection 0.2 mg, 0.2 mg, Intramuscular, Q2 Min PRN **OR** naloxone (NARCAN) injection 0.4 mg, 0.4 mg, Intramuscular, Q2 Min PRN, River Torres DO     ondansetron (ZOFRAN-ODT) ODT tab 4 mg, 4 mg, Oral, Q6H PRN **OR** ondansetron (ZOFRAN) injection 4 mg, 4 mg, Intravenous, Q6H PRN, Jony Ervin MD     oxyCODONE (ROXICODONE) tablet 5 mg, 5 mg, Oral, Q4H PRN **OR** oxyCODONE (ROXICODONE) tablet 10 mg, 10 mg, Oral, Q4H PRN, River Torres DO     [START ON 3/17/2022] polyethylene glycol (MIRALAX) Packet 17 g, 17 g, Oral, Daily, River Torres DO     prochlorperazine (COMPAZINE) injection 10 mg, 10 mg, Intravenous, Q6H PRN **OR** prochlorperazine (COMPAZINE) tablet 10 mg, 10 mg, Oral, Q6H PRN, River Torres DO     senna-docusate (SENOKOT-S/PERICOLACE) 8.6-50 MG per tablet 1 tablet, 1 tablet, Oral, BID, River Torres DO     sodium chloride (PF) 0.9% PF flush 3 mL, 3  mL, Intracatheter, Q8H, River Torres DO, 3 mL at 03/16/22 1943     sodium chloride (PF) 0.9% PF flush 3 mL, 3 mL, Intracatheter, q1 min prn, River Torres DO     sodium chloride 0.9% (bottle) irrigation, , , PRN, River Torres DO, 900 mL at 03/16/22 1445     sodium chloride 0.9% infusion, , Intravenous, Continuous, River Torres DO, Last Rate: 100 mL/hr at 03/16/22 1828, New Bag at 03/16/22 1828     sterile talc (STERITALC) powder for sclerosing 4 g, 4 g, INTRAPLEURAL, Once, River Torres DO   Scheduled Meds:    acetaminophen  975 mg Oral Q8H     [START ON 3/17/2022] polyethylene glycol  17 g Oral Daily     senna-docusate  1 tablet Oral BID     sodium chloride (PF)  3 mL Intracatheter Q8H     sterile talc  4 g INTRAPLEURAL Once     Continuous Infusions:    EPIDURAL INFUSION 6 mL/hr at 03/16/22 1810     - MEDICATION INSTRUCTIONS -       sodium chloride 100 mL/hr at 03/16/22 1828     PRN Meds:.[START ON 3/19/2022] acetaminophen, bisacodyl, HYDROmorphone **OR** HYDROmorphone, lidocaine 4%, magnesium hydroxide, - MEDICATION INSTRUCTIONS -, nalbuphine, naloxone **OR** naloxone **OR** naloxone **OR** naloxone, ondansetron **OR** ondansetron, oxyCODONE **OR** oxyCODONE, prochlorperazine **OR** prochlorperazine, sodium chloride (PF), sodium chloride 0.9% (bottle)    SOCIAL HISTORY:  Social History     Socioeconomic History     Marital status:      Spouse name: Not on file     Number of children: Not on file     Years of education: Not on file     Highest education level: Not on file   Occupational History     Not on file   Tobacco Use     Smoking status: Former Smoker     Smokeless tobacco: Never Used   Substance and Sexual Activity     Alcohol use: Yes     Drug use: Never     Sexual activity: Not on file   Other Topics Concern     Parent/sibling w/ CABG, MI or angioplasty before 65F 55M? Not Asked   Social History Narrative     Not on file     Social Determinants of Health  "    Financial Resource Strain: Not on file   Food Insecurity: Not on file   Transportation Needs: Not on file   Physical Activity: Not on file   Stress: Not on file   Social Connections: Not on file   Intimate Partner Violence: Not on file   Housing Stability: Not on file       FAMILY HISTORY:  History reviewed. No pertinent family history.     ROS:  12 point review of systems reviewed and is negative except for what has already been mentioned in HPI.  Reviewed      PHYSICAL EXAM:  GENRL:  **  /71 (BP Location: Right arm)   Pulse 75   Temp 98.7  F (37.1  C) (Oral)   Resp 18   Ht 1.778 m (5' 10\")   Wt 84.5 kg (186 lb 3 oz)   SpO2 94%   BMI 26.72 kg/m    I/O last 3 completed shifts:  In: 250   Out: -   I/O this shift:  In: 2750 [I.V.:500; Other:2000]  Out: 550 [Urine:350; Chest Tube:200]  HEENT: NC/AT  CHEST: Clear to auscultation bilateral anteriorly, no ronchi or wheezing  HEART: S1S2 normal, regular.   ABDMN: Soft. Non-tender, non-distended.  No guarding or rigidity. Bowel sounds- active  EXTRM: No pedal edema   INTGM: No skin rash, no cyanosis or clubbing  MUSCULOSKELETAL: no joint tenderness or swelling on upper and lower extremities  NEURO: Alert and oriented. No focal neurological deficit.        DIAGNOSTIC DATA:    Recent Labs   Lab 03/16/22  1036   WBC 15.6*   HGB 11.3*   HCT 34.5*          Recent Labs   Lab 03/16/22  1036      CO2 24   BUN 14       No results for input(s): INR in the last 168 hours.    Recent Labs   Lab 03/16/22  1036      CO2 24   BUN 14   ALBUMIN 3.5   ALKPHOS 56   ALT 13   AST 22       [unfilled]    CT Chest/Abdomen/Pelvis w Contrast    Result Date: 3/16/2022  EXAM: CT CHEST/ABDOMEN/PELVIS W CONTRAST LOCATION: [facility] DATE/TIME: 3/16/2022 10:43 AM INDICATION: Chest-abdomen-pelvis trauma, blunt; abdominal pain and distention; chest pain COMPARISON: None. TECHNIQUE: CT scan of the chest, abdomen, and pelvis was performed " following injection of IV contrast. Multiplanar reformats were obtained. Dose reduction techniques were used. CONTRAST: IsoVue 370 100mL FINDINGS: LUNGS AND PLEURA: Large attenuation left pleural fluid consistent with hemothorax. Minimal left pneumothorax. Multisegment atelectasis of the basilar left lower lobe as well as compression but not collapse of the superior segment left lower lobe and entire left upper lobe. Right lung is mildly underinflated with a band of atelectasis along the diaphragmatic pleura but otherwise clear. Trachea and central airways are patent and normal caliber. MEDIASTINUM: No mediastinal shift. Cardiac chambers are normal in size. No pericardial effusion. No findings to suggest traumatic aortic injury. No mediastinal hematoma. Esophagus is decompressed. CORONARY ARTERY CALCIFICATION: None. HEPATOBILIARY: No laceration. PANCREAS: Normal. SPLEEN: No laceration. Normal in size. ADRENAL GLANDS: Normal. KIDNEYS/BLADDER: Normal. BOWEL: Diverticulosis of the colon. No acute inflammatory change. No obstruction. No pneumatosis or pneumoperitoneum. No ascites. LYMPH NODES: Normal. VASCULATURE: Mild aortoiliac atheromatous calcification. No findings to suggest traumatic vascular injury in the abdomen or pelvis. PELVIC ORGANS: Evaluation of the central pelvis is limited by beam hardening from bilateral hip joint replacements. Prostate gland is normal in size. No pelvic free fluid. MUSCULOSKELETAL: Bilateral hip replacements. Advanced osteoarthrosis of both glenohumeral joints. There are oblique, mildly displaced fractures of the left superolateral third and fourth ribs and a moderately displaced fracture of the left anterior second rib with bayonet apposition. There are additional mild to moderately displaced fractures of the left posterolateral eighth, posterior ninth and 10th, 11th, and 12th ribs. There is an old ununited fracture of the right posterior 11th rib. There is subcutaneous emphysema in  the left posterior chest wall. Comminuted fracture of the lateral aspect of the left clavicle with mild subluxation at the left AC joint.     IMPRESSION: 1.  Multiple displaced left rib fractures including ribs 2, 3, 4, and 8 through 12. The posterior 10th rib is moderately displaced. 2.  Large left hemothorax and small left pneumothorax related to #1. 3.  Compression of the left lung secondary to #2 with multisegment collapse of the basilar segments of the left lower lobe.    XR Chest Port 1 View    Result Date: 3/16/2022  EXAM: XR CHEST PORT 1 VIEW LOCATION: [facility] DATE/TIME: 3/16/2022 3:51 PM INDICATION: Left-sided hemothorax status post video-assisted thoracoscopic evacuation. COMPARISON: Today's earlier 03/16/2022 10:30 AM CT chest, abdomen and pelvis.     IMPRESSION: Interval surgical evacuation of the large left hemothorax with placement of apical and basilar chest tubes. Subcutaneous emphysema left lower lateral chest wall and left neck base. Near complete reexpansion of the left upper lobe and improved  aeration of the left lower lobe with residual consolidation and volume loss in the basilar segments. Consolidation and volume loss basilar segments right lower lobe. Shallow inspiration exaggerates heart size and pulmonary vascularity which appear to be  within normal limits. Comminuted and displaced fracture distal left clavicle with superior subluxation. Fractures involving left ribs 2-4 and 8-12 as detailed in today's earlier CT are not well visualized radiographically.    XR Shoulder Left G/E 3 Views    Result Date: 3/15/2022  EXAM: XR SHOULDER LEFT G/E 3 VIEWS LOCATION: [facility] DATE/TIME: 3/15/2022 12:19 AM INDICATION: Pain after fall. COMPARISON: 10/20/2021.     IMPRESSION: Fracture of the distal clavicle and subluxation at the AC joint. The acromion on is subluxed approximately 4 cm inferior to the distal clavicle. Chronic deformity of the  humeral head. No dislocation.       Patient's new lab studies reviewed personally.  Patient's new radiology reports reviewed personally.  I personally viewed and personally interpreted patient's EKG:     Note created using dragon voice recognition software.  Errors in spelling or words which seems out of context are unintentional.  Sounds alike errors may have escaped editing.     03/16/2022      Donna Page  [facility] HMS

## 2022-03-18 ENCOUNTER — APPOINTMENT (OUTPATIENT)
Dept: PHYSICAL THERAPY | Facility: HOSPITAL | Age: 62
End: 2022-03-18
Attending: PHYSICIAN ASSISTANT
Payer: COMMERCIAL

## 2022-03-18 ENCOUNTER — APPOINTMENT (OUTPATIENT)
Dept: RADIOLOGY | Facility: HOSPITAL | Age: 62
End: 2022-03-18
Attending: SURGERY
Payer: COMMERCIAL

## 2022-03-18 ENCOUNTER — APPOINTMENT (OUTPATIENT)
Dept: OCCUPATIONAL THERAPY | Facility: HOSPITAL | Age: 62
End: 2022-03-18
Payer: COMMERCIAL

## 2022-03-18 LAB — GLUCOSE BLDC GLUCOMTR-MCNC: 98 MG/DL (ref 70–99)

## 2022-03-18 PROCEDURE — 250N000013 HC RX MED GY IP 250 OP 250 PS 637: Performed by: SURGERY

## 2022-03-18 PROCEDURE — 97162 PT EVAL MOD COMPLEX 30 MIN: CPT | Mod: GP | Performed by: PHYSICAL THERAPIST

## 2022-03-18 PROCEDURE — 99231 SBSQ HOSP IP/OBS SF/LOW 25: CPT | Performed by: NURSE PRACTITIONER

## 2022-03-18 PROCEDURE — 250N000011 HC RX IP 250 OP 636: Performed by: ANESTHESIOLOGY

## 2022-03-18 PROCEDURE — 250N000011 HC RX IP 250 OP 636: Performed by: SURGERY

## 2022-03-18 PROCEDURE — 97116 GAIT TRAINING THERAPY: CPT | Mod: GP | Performed by: PHYSICAL THERAPIST

## 2022-03-18 PROCEDURE — 99232 SBSQ HOSP IP/OBS MODERATE 35: CPT | Performed by: HOSPITALIST

## 2022-03-18 PROCEDURE — 120N000001 HC R&B MED SURG/OB

## 2022-03-18 PROCEDURE — 258N000003 HC RX IP 258 OP 636: Performed by: ANESTHESIOLOGY

## 2022-03-18 PROCEDURE — 71045 X-RAY EXAM CHEST 1 VIEW: CPT

## 2022-03-18 PROCEDURE — 97535 SELF CARE MNGMENT TRAINING: CPT | Mod: GO

## 2022-03-18 RX ADMIN — HYDROMORPHONE HYDROCHLORIDE 0.2 MG: 0.2 INJECTION, SOLUTION INTRAMUSCULAR; INTRAVENOUS; SUBCUTANEOUS at 09:41

## 2022-03-18 RX ADMIN — ACETAMINOPHEN 975 MG: 325 TABLET ORAL at 01:52

## 2022-03-18 RX ADMIN — ACETAMINOPHEN 975 MG: 325 TABLET ORAL at 18:15

## 2022-03-18 RX ADMIN — BISACODYL 10 MG: 10 SUPPOSITORY RECTAL at 14:58

## 2022-03-18 RX ADMIN — BUPIVACAINE HYDROCHLORIDE: 7.5 INJECTION, SOLUTION EPIDURAL; RETROBULBAR at 08:34

## 2022-03-18 RX ADMIN — DOCUSATE SODIUM 50 MG AND SENNOSIDES 8.6 MG 1 TABLET: 8.6; 5 TABLET, FILM COATED ORAL at 08:29

## 2022-03-18 RX ADMIN — ACETAMINOPHEN 975 MG: 325 TABLET ORAL at 09:40

## 2022-03-18 ASSESSMENT — ACTIVITIES OF DAILY LIVING (ADL)
ADLS_ACUITY_SCORE: 12
ADLS_ACUITY_SCORE: 10
ADLS_ACUITY_SCORE: 12
ADLS_ACUITY_SCORE: 10
ADLS_ACUITY_SCORE: 12
ADLS_ACUITY_SCORE: 10
ADLS_ACUITY_SCORE: 12
ADLS_ACUITY_SCORE: 10
ADLS_ACUITY_SCORE: 12

## 2022-03-18 NOTE — CONSULTS
Integrative Therapy Consult    Healing PresenceYes  Essential Oils: Topical (EO/Topical Oil), Inhalation (EO/Topical oil)     Orthopedic Blend - HC, Lavender Massage Oil - HC       Healing Music:       Breathwork:       Guided Imagery:       Acupressure: Hands on Li4     Oshibori:       Energy Therapy:       Healing Touch:       Reiki:       Qi Gong:     Massage: Hand, Foot      Targeted Massage:    Sleep Promotion:       Other Therapy:       Intervention Reason: Well Being (desires experience)     Pre and Post Session Scores: Patient Desires Treatment: yes                             Delivery:         Referrals:      Donna Wolfe

## 2022-03-18 NOTE — PLAN OF CARE
Problem: Plan of Care - These are the overarching goals to be used throughout the patient stay.    Goal: Plan of Care Review/Shift Note  Description: The Plan of Care Review/Shift note should be completed every shift.  The Outcome Evaluation is a brief statement about your assessment that the patient is improving, declining, or no change.  This information will be displayed automatically on your shift note.  Outcome: Ongoing, Progressing     Problem: Plan of Care - These are the overarching goals to be used throughout the patient stay.    Goal: Absence of Hospital-Acquired Illness or Injury  Intervention: Identify and Manage Fall Risk  Recent Flowsheet Documentation  Taken 3/18/2022 1600 by Mike Fletcher RN  Safety Promotion/Fall Prevention:   activity supervised   assistive device/personal items within reach  Intervention: Prevent Skin Injury  Recent Flowsheet Documentation  Taken 3/18/2022 1600 by Mike Fletcher RN  Body Position: position changed independently  Intervention: Prevent and Manage VTE (Venous Thromboembolism) Risk  Recent Flowsheet Documentation  Taken 3/18/2022 1600 by Mike Fletcher RN  Activity Management: ambulated to bathroom     Problem: Plan of Care - These are the overarching goals to be used throughout the patient stay.    Goal: Absence of Hospital-Acquired Illness or Injury  Intervention: Prevent Skin Injury  Recent Flowsheet Documentation  Taken 3/18/2022 1600 by Mike Fletcher RN  Body Position: position changed independently     Problem: Pain Acute  Goal: Acceptable Pain Control and Functional Ability  Intervention: Prevent or Manage Pain  Recent Flowsheet Documentation  Taken 3/18/2022 1600 by Mike Fletcher RN  Medication Review/Management: medications reviewed   Goal Outcome Evaluation:  Minimal pain, ambulating to bathroom with walker, Large BM, O2 sats 95% 1 L 02. Chest tube clamped, no respiratory distress noted. Epidural pump continuous 7ml/hr. Epidural dressing  intact.

## 2022-03-18 NOTE — PLAN OF CARE
Problem: Plan of Care - These are the overarching goals to be used throughout the patient stay.    Goal: Optimal Comfort and Wellbeing  Outcome: Ongoing, Progressing  Intervention: Monitor Pain and Promote Comfort  Recent Flowsheet Documentation  Taken 3/18/2022 0941 by Alice Asif RN  Pain Management Interventions: medication (see MAR)     Problem: Pain Acute  Goal: Acceptable Pain Control and Functional Ability  Intervention: Prevent or Manage Pain  Recent Flowsheet Documentation  Taken 3/18/2022 0900 by Alice Asif RN  Medication Review/Management: medications reviewed   Goal Outcome Evaluation:    Chest tube clamped 1154, Patient ambulated well with PT. Pain managed with tylenol PO and dilaudid IV.

## 2022-03-18 NOTE — PROGRESS NOTES
Hospitalist Progress Note    Assessment/Plan    Active Problems:    Hemothorax on left    61 year old male presented to ED for evaluation of abdominal distention.  Patient had a fall accident 2 days ago when he fell down 15 flight of stairs.  Chest x-ray patient was found to have hemothorax, s/p chest tube placement by surgery.      Left hemothorax  -Patient sustained fall accident 2 days PTA by falling down 15 flight of stairs.  -He came on the day of fall and was found to have clavicular fracture and was sent home at that time. Pt returned with abd distension and work up with CT chest reported as multiple displaced left rib fractures including ribs 2, 3, 4, and 8 through 12. The posterior 10th rib is moderately displaced.Large left hemothorax and small left pneumothorax Compression of the left lung secondary with multisegment collapse of the basilar segments of the left lower lobe.  -S/p left chest tube placement, managed by surgery. Clamped today  -Monitor with serial chest xray  -Encourage IS       Arthritis of right hip, shoulder  -Pain management    Acute blood loss anemia   -Hb dropped from 11.3-->8.6 likely 2/2 to hemothorax.  -Monitor. Recheck labs in AM    Barriers to Discharge:  Chest tube, pain control    Anticipated discharge date/Disposition: 1-2 days    Subjective  Chart reviewed and events noted.  Patient seen and examined.   Sitting up, feels much better. Pain controlled.    Objective    Vital signs in last 24 hours  Temp:  [97.9  F (36.6  C)-98.9  F (37.2  C)] 98.3  F (36.8  C)  Pulse:  [81-88] 88  Resp:  [16-19] 19  BP: (117-140)/(67-78) 137/74  SpO2:  [90 %-97 %] 90 % [unfilled] O2 Device: Nasal cannula    Weight:   [unfilled] Weight change:     Intake/Output last 3 shifts  I/O last 3 completed shifts:  In: 1560 [P.O.:1560]  Out: 2410 [Urine:2100; Chest Tube:310]  Body mass index is 26.72 kg/m .    Physical Exam    General Appearance:    Alert, cooperative, no distress, appears stated age    Lungs:     Diminished BS, left chest tube in place   Cardiovascular:    RRR   Abdomen:     Soft   Neurologic:   Awake, alert, oriented, speech clear, moving all extremities     Pertinent Labs   Lab Results: personally reviewed.   Recent Labs   Lab 03/17/22  0631 03/16/22  1036    137   CO2 27 24   BUN 7* 14   ALBUMIN 3.1* 3.5   ALKPHOS 41* 56   ALT 9 13   AST 18 22     Recent Labs   Lab 03/17/22  0142 03/16/22  1036   WBC 8.2 15.6*   HGB 8.6* 11.3*   HCT 26.1* 34.5*    231     No results for input(s): CKTOTAL, TROPONINI in the last 168 hours.    Invalid input(s): TROPONINT, CKMBINDEX  Invalid input(s): POCGLUFGR    Medications  Drug and lactation database from the [place] [employer] of Medicine:  http://toxnet.nlm.nih.gov/cgi-bin/sis/htmlgen?LACT      Pertinent Radiology   Radiology Results:  Personally reviewed impressions    Reviewed labs in last 24 hours.    Advanced Care Planning:  Discharge Planning discussed with patient  Total time with this patient is 35 min with greater than 50% of time spent in coordination of care.  Care discussed and coordinated with patient, SW.    This Note is created using dragon voice recognition software.  Errors in spelling or words which seems out of context are unintentional.  Sounds alike errors may have escaped editing.    Lorin Peacock MD  Hospitalist

## 2022-03-18 NOTE — PLAN OF CARE
"  Problem: Plan of Care - These are the overarching goals to be used throughout the patient stay.    Goal: Patient-Specific Goal (Individualized)  Description: You can add care plan individualizations to a care plan. Examples of Individualization might be:  \"Parent requests to be called daily at 9am for status\", \"I have a hard time hearing out of my right ear\", or \"Do not touch me to wake me up as it startles me\".  Outcome: Ongoing, Progressing     Problem: Plan of Care - These are the overarching goals to be used throughout the patient stay.    Goal: Optimal Comfort and Wellbeing  Outcome: Ongoing, Progressing    A/O x4. VSS. Sat 93% on 1 L oxygen per NC. Used IS independently multiple times. IS achieved 1000 ml. Chest tube output 250 ml over 15 hours period. On continuous epidural medication for pain. PRN IV dilaudid given x2 which was effective per pt. Up in chair for over 8 hours. Able to use BR with assist of one person. Diet has been upgraded to regular and tolerated well. Pt states feeling way better today.                          "

## 2022-03-18 NOTE — PLAN OF CARE
Occupational Therapy Discharge Summary    Reason for therapy discharge:    All goals and outcomes met, no further needs identified.    Progress towards therapy goal(s). See goals on Care Plan in [facility] electronic health record for goal details.  Goals met    Therapy recommendation(s):    No further therapy is recommended.    Goal Outcome Evaluation:

## 2022-03-18 NOTE — PROGRESS NOTES
ASSESSMENT:  1. Hemothorax on left        Smith Tan is a 61 year old male who is s/p VATs on 3/16/22. He is doing quite well and chest xray is stable.     PLAN:  Chest tubes clamped; if patient has respiratory distress, unclamp chest tubes, get STAT chest xray and let surgery know  Repeat chest xray in the morning; if stable, we will pull his chest tubes tomorrow    SUBJECTIVE:   He is feeling well. Some SOB with activity but this abates when at rest. Pain controlled. Hitting 1500 on his IS.      Patient Vitals for the past 24 hrs:   BP Temp Temp src Pulse Resp SpO2   03/18/22 1200 137/74 98.3  F (36.8  C) Oral 88 19 90 %   03/18/22 0717 (!) 140/78 98.5  F (36.9  C) Oral 85 17 92 %   03/17/22 2317 134/77 98.4  F (36.9  C) Oral 81 16 94 %   03/17/22 1922 117/68 98.9  F (37.2  C) Oral 88 16 97 %   03/17/22 1523 123/67 97.9  F (36.6  C) Oral 86 18 93 %         PHYSICAL EXAM:  GEN: No acute distress, comfortable  LUNGS: CTA bilaterally  CHEST: no crepitus noted, Chest tubes intact with no air leak; tidaling with inspiration noted; serosanguinous fluid in atrium with 310 ml/24 output (60 ml last shift)  CV:RRR  EXT:No cyanosis, edema or obvious abnormalities    03/17 0700 - 03/18 0659  In: 1560 [P.O.:1560]  Out: 2410 [Urine:2100]    Admission on 03/16/2022   Component Date Value     Ventricular Rate 03/16/2022 101      Atrial Rate 03/16/2022 101      [place] Interval 03/16/2022 154      QRS Duration 03/16/2022 82      QT 03/16/2022 332      QTc 03/16/2022 430      P Axis 03/16/2022 51      R AXIS 03/16/2022 42      T [place] 03/16/2022 26      Interpretation ECG 03/16/2022                      Value:Sinus tachycardia  Otherwise normal ECG  No previous ECGs available  Confirmed by SEE ED PROVIDER NOTE FOR, ECG INTERPRETATION (4000),  SMITH MACHADO ([id number]) on 3/16/2022 2:58:41 PM       Sodium 03/16/2022 137      Potassium 03/16/2022 3.7      Chloride 03/16/2022 103      Carbon Dioxide (CO2) 03/16/2022 24      Anion  Gap 03/16/2022 10      Urea Nitrogen 03/16/2022 14      Creatinine 03/16/2022 0.72      Calcium 03/16/2022 8.5      Glucose 03/16/2022 168 (A)     Alkaline Phosphatase 03/16/2022 56      AST 03/16/2022 22      ALT 03/16/2022 13      Protein Total 03/16/2022 6.0      Albumin 03/16/2022 3.5      Bilirubin Total 03/16/2022 0.9      GFR Estimate 03/16/2022 >90      WBC Count 03/16/2022 15.6 (A)     RBC Count 03/16/2022 3.32 (A)     Hemoglobin 03/16/2022 11.3 (A)     Hematocrit 03/16/2022 34.5 (A)     MCV 03/16/2022 104 (A)     MCH 03/16/2022 34.0 (A)     MCHC 03/16/2022 32.8      RDW 03/16/2022 12.7      Platelet Count 03/16/2022 231      % Neutrophils 03/16/2022 84      % Lymphocytes 03/16/2022 6      % Monocytes 03/16/2022 9      % Eosinophils 03/16/2022 0      % Basophils 03/16/2022 0      % Immature Granulocytes 03/16/2022 1      NRBCs per 100 WBC 03/16/2022 0      Absolute Neutrophils 03/16/2022 13.2 (A)     Absolute Lymphocytes 03/16/2022 1.0      Absolute Monocytes 03/16/2022 1.4 (A)     Absolute Eosinophils 03/16/2022 0.0      Absolute Basophils 03/16/2022 0.0      Absolute Immature Granul* 03/16/2022 0.1      Absolute NRBCs 03/16/2022 0.0      Creatinine POCT 03/16/2022 0.6 (A)     GFR, ESTIMATED POCT 03/16/2022 >60      SARS CoV2 PCR 03/16/2022 Negative      ABO/RH(D) 03/16/2022 A POS      Antibody Screen 03/16/2022 Negative      SPECIMEN EXPIRATION DATE 03/16/2022 [id number]      Creatinine 03/16/2022 0.61 (A)     GFR Estimate 03/16/2022 >90      Sodium 03/17/2022 139      Potassium 03/17/2022 4.3      Chloride 03/17/2022 106      Carbon Dioxide (CO2) 03/17/2022 27      Anion Gap 03/17/2022 6      Urea Nitrogen 03/17/2022 7 (A)     Creatinine 03/17/2022 0.59 (A)     Calcium 03/17/2022 7.9 (A)     Glucose 03/17/2022 121      Alkaline Phosphatase 03/17/2022 41 (A)     AST 03/17/2022 18      ALT 03/17/2022 9      Protein Total 03/17/2022 5.2 (A)     Albumin 03/17/2022 3.1 (A)     Bilirubin Total 03/17/2022  0.6      GFR Estimate 03/17/2022 >90      WBC Count 03/17/2022 8.2      RBC Count 03/17/2022 2.51 (A)     Hemoglobin 03/17/2022 8.6 (A)     Hematocrit 03/17/2022 26.1 (A)     MCV 03/17/2022 104 (A)     MCH 03/17/2022 34.3 (A)     MCHC 03/17/2022 33.0      RDW 03/17/2022 12.6      Platelet Count 03/17/2022 168      % Neutrophils 03/17/2022 81      % Lymphocytes 03/17/2022 8      % Monocytes 03/17/2022 10      % Eosinophils 03/17/2022 0      % Basophils 03/17/2022 0      % Immature Granulocytes 03/17/2022 1      NRBCs per 100 WBC 03/17/2022 0      Absolute Neutrophils 03/17/2022 6.7      Absolute Lymphocytes 03/17/2022 0.7 (A)     Absolute Monocytes 03/17/2022 0.8      Absolute Eosinophils 03/17/2022 0.0      Absolute Basophils 03/17/2022 0.0      Absolute Immature Granul* 03/17/2022 0.1      Absolute NRBCs 03/17/2022 0.0      GLUCOSE BY METER POCT 03/17/2022 116 (A)     GLUCOSE BY METER POCT 03/18/2022 98           Yee Vargas, APRN CNP

## 2022-03-18 NOTE — ANESTHESIA POST-OP FOLLOW-UP NOTE
Patient: Adan Tan    Procedure(s):  VIDEO-ASSISTED THORACOSCOPIC SURGERY (VATS) EVACUATION OF HEMOTHORAX    Anesthesia Type:  General    POD # 2. Sitting in chair. Denies any pain. CTs clamped. Dressing clean/dry/intact. Continue epidural at current rate.     Janna Guevara MD  [facility]

## 2022-03-18 NOTE — PLAN OF CARE
Problem: Plan of Care - These are the overarching goals to be used throughout the patient stay.    Goal: Optimal Comfort and Wellbeing  Outcome: Ongoing, Progressing  Intervention: Monitor Pain and Promote Comfort  Recent Flowsheet Documentation  Taken 3/18/2022 0007 by Meghan Anderson RN  Pain Management Interventions:   medication offered but refused   declines   Goal Outcome Evaluation:          Pain managed with continuous epidural pain pump and scheduled Tylenol. Stated his pain was 1 or 2 throughout night and declined prn pain medication. Chest tubes to water seal; intact. Serosanguinous output = 60 ml. POD 2 blood sugar = 98.

## 2022-03-18 NOTE — PROGRESS NOTES
03/18/22 1050   Quick Adds   Type of Visit Initial PT Evaluation   Living Environment   People in Home spouse   Current Living Arrangements house   Home Accessibility stairs to enter home   Number of Stairs, Main Entrance 3   Self-Care   Equipment Currently Used at Home none   Fall history within last six months yes   Number of times patient has fallen within last six months 1   General Information   Onset of Illness/Injury or Date of Surgery 03/16/22   Referring Physician Padilla Meade PA-C   Patient/Family Therapy Goals Statement (PT) None stated.   Pertinent History of Current Problem (include personal factors and/or comorbidities that impact the POC) Pt admitted s/p fall down stairs, hemothorax.   Existing Precautions/Restrictions fall;other (see comments)  (chest tube, epidural)   Range of Motion (ROM)   Range of Motion ROM is WFL   Strength (Manual Muscle Testing)   Strength (Manual Muscle Testing) strength is WFL   Transfers   Transfers sit-stand transfer   Sit-Stand Transfer   Sit-Stand [place] (Transfers) supervision   Assistive Device (Sit-Stand Transfers) cane, straight   Gait/Stairs (Locomotion)   [place] Level (Gait) verbal cues;contact guard   Assistive Device (Gait) cane, straight   Distance in Feet (Required for LE Total Joints) 200'   Pattern (Gait) step-through   Deviations/Abnormal Patterns (Gait) gait speed decreased;nikita decreased   Clinical Impression   Criteria for Skilled Therapeutic Intervention Yes, treatment indicated   PT Diagnosis (PT) impaired functional mobility   Influenced by the following impairments decreased strength, impaired balance   Functional limitations due to impairments difficulty with transfers, ambulation   Clinical Presentation (PT Evaluation Complexity) Evolving/Changing   Clinical Presentation Rationale Pt presents as medically diagnosed.   Clinical Decision Making (Complexity) moderate complexity   Planned Therapy Interventions (PT) balance  training;bed mobility training;gait training;home exercise program;neuromuscular re-education;patient/family education;strengthening;stair training;transfer training   Anticipated Equipment Needs at Discharge (PT) cane, straight   Risk & Benefits of therapy have been explained evaluation/treatment results reviewed;participants voiced agreement with care plan;participants included;patient   PT Discharge Planning   PT Discharge Recommendation (DC Rec) home with assist   PT Rationale for DC Rec Pt moving well with assist of 1.   Plan of Care Review   Plan of Care Reviewed With patient;spouse   Total Evaluation Time   Total Evaluation Time (Minutes) 10   Physical Therapy Goals   PT Frequency Daily   PT Predicated Duration/Target Date for Goal Attainment 03/25/22   PT Goals Bed Mobility;Transfers;Gait;Stairs   PT: Bed Mobility Supervision/stand-by assist;Supine to/from sit   PT: Transfers Supervision/stand-by assist;Sit to/from stand   PT: Gait Supervision/stand-by assist;Assistive device;Straight cane;Greater than 200 feet   PT: Stairs Supervision/stand-by assist;Assistive device;3 stairs     Saba Lazo, PT  3/18/2022

## 2022-03-19 ENCOUNTER — APPOINTMENT (OUTPATIENT)
Dept: RADIOLOGY | Facility: HOSPITAL | Age: 62
End: 2022-03-19
Attending: SURGERY
Payer: COMMERCIAL

## 2022-03-19 ENCOUNTER — APPOINTMENT (OUTPATIENT)
Dept: PHYSICAL THERAPY | Facility: HOSPITAL | Age: 62
End: 2022-03-19
Payer: COMMERCIAL

## 2022-03-19 LAB
ERYTHROCYTE [DISTWIDTH] IN BLOOD BY AUTOMATED COUNT: 12.4 % (ref 10–15)
HCT VFR BLD AUTO: 25.5 % (ref 40–53)
HGB BLD-MCNC: 8.3 G/DL (ref 13.3–17.7)
HOLD SPECIMEN: NORMAL
MCH RBC QN AUTO: 33.7 PG (ref 26.5–33)
MCHC RBC AUTO-ENTMCNC: 32.5 G/DL (ref 31.5–36.5)
MCV RBC AUTO: 104 FL (ref 78–100)
PLATELET # BLD AUTO: 197 10E3/UL (ref 150–450)
RBC # BLD AUTO: 2.46 10E6/UL (ref 4.4–5.9)
WBC # BLD AUTO: 7.9 10E3/UL (ref 4–11)

## 2022-03-19 PROCEDURE — 71045 X-RAY EXAM CHEST 1 VIEW: CPT

## 2022-03-19 PROCEDURE — 99232 SBSQ HOSP IP/OBS MODERATE 35: CPT | Performed by: HOSPITALIST

## 2022-03-19 PROCEDURE — 99231 SBSQ HOSP IP/OBS SF/LOW 25: CPT | Performed by: PHYSICIAN ASSISTANT

## 2022-03-19 PROCEDURE — 85027 COMPLETE CBC AUTOMATED: CPT | Performed by: HOSPITALIST

## 2022-03-19 PROCEDURE — 250N000013 HC RX MED GY IP 250 OP 250 PS 637: Performed by: SURGERY

## 2022-03-19 PROCEDURE — 36415 COLL VENOUS BLD VENIPUNCTURE: CPT | Performed by: HOSPITALIST

## 2022-03-19 PROCEDURE — 97116 GAIT TRAINING THERAPY: CPT | Mod: GP

## 2022-03-19 PROCEDURE — 120N000001 HC R&B MED SURG/OB

## 2022-03-19 RX ADMIN — POLYETHYLENE GLYCOL 3350 17 G: 17 POWDER, FOR SOLUTION ORAL at 09:55

## 2022-03-19 RX ADMIN — OXYCODONE HYDROCHLORIDE 5 MG: 5 TABLET ORAL at 00:26

## 2022-03-19 RX ADMIN — DOCUSATE SODIUM 50 MG AND SENNOSIDES 8.6 MG 1 TABLET: 8.6; 5 TABLET, FILM COATED ORAL at 09:55

## 2022-03-19 RX ADMIN — OXYCODONE HYDROCHLORIDE 5 MG: 5 TABLET ORAL at 23:25

## 2022-03-19 RX ADMIN — ACETAMINOPHEN 975 MG: 325 TABLET ORAL at 15:49

## 2022-03-19 RX ADMIN — ACETAMINOPHEN 650 MG: 325 TABLET ORAL at 21:42

## 2022-03-19 RX ADMIN — ACETAMINOPHEN 975 MG: 325 TABLET ORAL at 06:23

## 2022-03-19 ASSESSMENT — ACTIVITIES OF DAILY LIVING (ADL)
ADLS_ACUITY_SCORE: 12
ADLS_ACUITY_SCORE: 11
ADLS_ACUITY_SCORE: 9
ADLS_ACUITY_SCORE: 10
ADLS_ACUITY_SCORE: 9
ADLS_ACUITY_SCORE: 10
ADLS_ACUITY_SCORE: 9
ADLS_ACUITY_SCORE: 9
ADLS_ACUITY_SCORE: 10
ADLS_ACUITY_SCORE: 12
ADLS_ACUITY_SCORE: 10
ADLS_ACUITY_SCORE: 9
ADLS_ACUITY_SCORE: 12
ADLS_ACUITY_SCORE: 11
ADLS_ACUITY_SCORE: 9
ADLS_ACUITY_SCORE: 9
ADLS_ACUITY_SCORE: 10
ADLS_ACUITY_SCORE: 11
ADLS_ACUITY_SCORE: 12
ADLS_ACUITY_SCORE: 10
ADLS_ACUITY_SCORE: 10
ADLS_ACUITY_SCORE: 12

## 2022-03-19 NOTE — ANESTHESIA POST-OP FOLLOW-UP NOTE
Patient: Adan Tan    Procedure(s):  VIDEO-ASSISTED THORACOSCOPIC SURGERY (VATS) EVACUATION OF HEMOTHORAX    Anesthesia Type:  General    POD # 3. CT removed. Pain well controlled. Tolerating orals. May go home tomorrow. Will pull epidural so team can transition patient to oral medications prior to discharge. Not on anticoagulation. Epidural pulled at 1354.     Janna Guevara MD  [facility]

## 2022-03-19 NOTE — PLAN OF CARE
Problem: Plan of Care - These are the overarching goals to be used throughout the patient stay.    Goal: Optimal Comfort and Wellbeing  Intervention: Monitor Pain and Promote Comfort  Recent Flowsheet Documentation  Taken 3/19/2022 0959 by Tanya Cid RN  Pain Management Interventions: repositioned   Goal Outcome Evaluation:    Patient denies any increase shortness of breath oxygen removed this morning and patient saturation in mid 90s on room air. Surgery removed chest tubes and anesthesia removed epidural. NO change in breathing patient reports pain is improved with tube out. Patient ambulating independently with tubes removed.

## 2022-03-19 NOTE — PROGRESS NOTES
Hospitalist Progress Note    Assessment/Plan    Active Problems:    Hemothorax on left    61 year old male presented to ED for evaluation of abdominal distention.  Patient had a fall accident 2 days ago when he fell down 15 flight of stairs.  Chest x-ray patient was found to have hemothorax, s/p chest tube placement by surgery.      Left hemothorax  -Patient sustained fall accident 2 days PTA by falling down 15 flight of stairs.  -He came on the day of fall and was found to have clavicular fracture and was sent home at that time. Pt returned with abd distension and work up with CT chest reported as multiple displaced left rib fractures including ribs 2, 3, 4, and 8 through 12. The posterior 10th rib is moderately displaced.Large left hemothorax and small left pneumothorax Compression of the left lung secondary with multisegment collapse of the basilar segments of the left lower lobe.  -S/p left chest tube placement, managed by surgery. Tolerating clamping well  -Monitor with serial chest xray, stable for now  -Encourage IS       Arthritis of right hip, shoulder  -Pain management    Acute blood loss anemia   -Hb dropped from 11.3-->8.6-->8.3 today  likely 2/2 to hemothorax.  -Monitor.     Barriers to Discharge:  Chest tube, pain control    Anticipated discharge date/Disposition: 1-2 days    Subjective  Chart reviewed and events noted.  Patient seen and examined.   Sitting up,feels better. No new complaints for today. Ambulating.    Objective    Vital signs in last 24 hours  Temp:  [98.3  F (36.8  C)-98.6  F (37  C)] 98.6  F (37  C)  Pulse:  [] 83  Resp:  [18-20] 18  BP: (123-139)/(71-78) 128/71  SpO2:  [92 %-97 %] 95 % [unfilled] O2 Device: Nasal cannula    Weight:   [unfilled] Weight change:     Intake/Output last 3 shifts  I/O last 3 completed shifts:  In: 577.4 [P.O.:480; I.V.:97.4]  Out: 940 [Urine:650; Chest Tube:290]  Body mass index is 26.72 kg/m .    Physical Exam    General Appearance:    Alert,  cooperative, no distress, appears stated age   Lungs:     Diminished BS, left chest tube in place   Cardiovascular:    RRR   Abdomen:     Soft   Neurologic:   Awake, alert, oriented, speech clear, moving all extremities     Pertinent Labs   Lab Results: personally reviewed.   Recent Labs   Lab 03/17/22  0631 03/16/22  1036    137   CO2 27 24   BUN 7* 14   ALBUMIN 3.1* 3.5   ALKPHOS 41* 56   ALT 9 13   AST 18 22     Recent Labs   Lab 03/19/22  0632 03/17/22  0142 03/16/22  1036   WBC 7.9 8.2 15.6*   HGB 8.3* 8.6* 11.3*   HCT 25.5* 26.1* 34.5*    168 231     No results for input(s): CKTOTAL, TROPONINI in the last 168 hours.    Invalid input(s): TROPONINT, CKMBINDEX  Invalid input(s): POCGLUFGR    Medications  Drug and lactation database from the [place] [employer] Medicine:  http://toxnet.nlm.nih.gov/cgi-bin/sis/htmlgen?LACT      Pertinent Radiology   Radiology Results:  Personally reviewed impressions    Reviewed labs in last 24 hours.    Advanced Care Planning:  Discharge Planning discussed with patient  Total time with this patient is 25 min with greater than 50% of time spent in coordination of care.  Care discussed and coordinated with patient.    This Note is created using dragon voice recognition software.  Errors in spelling or words which seems out of context are unintentional.  Sounds alike errors may have escaped editing.    Lorin Peacock MD  Hospitalist

## 2022-03-19 NOTE — ADDENDUM NOTE
Addendum  created 03/19/22 1659 by Janna Guevara MD    Clinical Note Signed, Intraprocedure Event edited

## 2022-03-19 NOTE — PLAN OF CARE
Physical Therapy Discharge Summary    Reason for therapy discharge:    All goals and outcomes met, no further needs identified.    Progress towards therapy goal(s). See goals on Care Plan in [facility] electronic health record for goal details.  Goals met    Therapy recommendation(s):    No further therapy is recommended. Pt denies concerns.      Andreia Cornejo, PT

## 2022-03-19 NOTE — PROGRESS NOTES
Surgery paged. Chest tube clamped, was still draining and moving closer toward tube entry. Surgery said to unclamp and drain into container an re clamp.

## 2022-03-19 NOTE — PLAN OF CARE
Pt reports feels okay. Was able to ambulate with assist of 1 from chair to bed. PRN Oxycodone 5mg administered po for back pain. Pt reported during reassessment that pain was better. Chest tube continues to be clamped at this time.       Problem: Plan of Care - These are the overarching goals to be used throughout the patient stay.    Goal: Optimal Comfort and Wellbeing  Outcome: Ongoing, Progressing  Intervention: Monitor Pain and Promote Comfort  Recent Flowsheet Documentation  Taken 3/18/2022 2330 by Brooklyn Vogt RN  Pain Management Interventions: medication (see MAR)     Problem: Plan of Care - These are the overarching goals to be used throughout the patient stay.    Goal: Optimal Comfort and Wellbeing  Intervention: Monitor Pain and Promote Comfort  Recent Flowsheet Documentation  Taken 3/18/2022 2330 by Brooklyn Vogt RN  Pain Management Interventions: medication (see MAR)

## 2022-03-19 NOTE — PROGRESS NOTES
ASSESSMENT:  1. Hemothorax on left        Adan Tan is a 61 year old male who is s/p VATS on 3/16/2022 for traumatic rib fractures and hemothorax  Chest tubes have been clamped since yesterday at noon without any difficulties and x-ray this morning only shows a very small nonmeasured pneumothorax    PLAN:  -I removed both chest tubes today.  In usual fashion.  Tolerated it very well and covered with folded Adaptic gauze and foam tape to make a sealed area  -Repeat x-ray in the morning  -Dressing should remain on for 48 hours    SUBJECTIVE:   He is doing well.  Overall pain is improving.  He is currently not requiring oxygen saturation.  Continues to use his incentive spirometry and hitting 1500.      Patient Vitals for the past 24 hrs:   BP Temp Temp src Pulse Resp SpO2   03/19/22 0959 -- -- -- -- -- 95 %   03/19/22 0732 128/71 98.6  F (37  C) Oral 83 18 94 %   03/19/22 0421 132/77 98.6  F (37  C) Oral 89 20 92 %   03/18/22 2330 139/77 98.3  F (36.8  C) -- 103 -- 97 %   03/18/22 1608 123/78 98.6  F (37  C) Oral 78 18 94 %         PHYSICAL EXAM:  GEN: No acute distress, comfortable  Lungs clear to auscultation  Heart regular rate and rhythm  No crepitus along the chest wall and no air leak.  Serosanguineous fluid in the atrium-some bruising along the lateral left side and trailing down into the hip  Removed chest tubes without any issues.  Covered with occluding dressings.      Output by Drain (mL) 03/17/22 0700 - 03/17/22 1459 03/17/22 1500 - 03/17/22 2259 03/17/22 2300 - 03/18/22 0659 03/18/22 0700 - 03/18/22 1459 03/18/22 1500 - 03/18/22 2259 03/18/22 2300 - 03/19/22 0659 03/19/22 0700 - 03/19/22 1424   Requested LDAs do not have output data documented.      EXT:No cyanosis, edema or obvious abnormalities    03/18 0700 - 03/19 0659  In: 577.4 [P.O.:480; I.V.:97.4]  Out: 940 [Urine:650]    Admission on 03/16/2022   Component Date Value     Ventricular Rate 03/16/2022 101      Atrial Rate 03/16/2022 101       [place] Interval 03/16/2022 154      QRS Duration 03/16/2022 82      QT 03/16/2022 332      QTc 03/16/2022 430      P Axis 03/16/2022 51      R AXIS 03/16/2022 42      T [place] 03/16/2022 26      Interpretation ECG 03/16/2022                      Value:Sinus tachycardia  Otherwise normal ECG  No previous ECGs available  Confirmed by SEE ED PROVIDER NOTE FOR, ECG INTERPRETATION (4000),  SMITH MACHADO ([id number]) on 3/16/2022 2:58:41 PM       Sodium 03/16/2022 137      Potassium 03/16/2022 3.7      Chloride 03/16/2022 103      Carbon Dioxide (CO2) 03/16/2022 24      Anion Gap 03/16/2022 10      Urea Nitrogen 03/16/2022 14      Creatinine 03/16/2022 0.72      Calcium 03/16/2022 8.5      Glucose 03/16/2022 168 (A)     Alkaline Phosphatase 03/16/2022 56      AST 03/16/2022 22      ALT 03/16/2022 13      Protein Total 03/16/2022 6.0      Albumin 03/16/2022 3.5      Bilirubin Total 03/16/2022 0.9      GFR Estimate 03/16/2022 >90      WBC Count 03/16/2022 15.6 (A)     RBC Count 03/16/2022 3.32 (A)     Hemoglobin 03/16/2022 11.3 (A)     Hematocrit 03/16/2022 34.5 (A)     MCV 03/16/2022 104 (A)     MCH 03/16/2022 34.0 (A)     MCHC 03/16/2022 32.8      RDW 03/16/2022 12.7      Platelet Count 03/16/2022 231      % Neutrophils 03/16/2022 84      % Lymphocytes 03/16/2022 6      % Monocytes 03/16/2022 9      % Eosinophils 03/16/2022 0      % Basophils 03/16/2022 0      % Immature Granulocytes 03/16/2022 1      NRBCs per 100 WBC 03/16/2022 0      Absolute Neutrophils 03/16/2022 13.2 (A)     Absolute Lymphocytes 03/16/2022 1.0      Absolute Monocytes 03/16/2022 1.4 (A)     Absolute Eosinophils 03/16/2022 0.0      Absolute Basophils 03/16/2022 0.0      Absolute Immature Granul* 03/16/2022 0.1      Absolute NRBCs 03/16/2022 0.0      Creatinine POCT 03/16/2022 0.6 (A)     GFR, ESTIMATED POCT 03/16/2022 >60      SARS CoV2 PCR 03/16/2022 Negative      ABO/RH(D) 03/16/2022 A POS      Antibody Screen 03/16/2022 Negative      SPECIMEN  EXPIRATION DATE 03/16/2022 [id number]      Creatinine 03/16/2022 0.61 (A)     GFR Estimate 03/16/2022 >90      Sodium 03/17/2022 139      Potassium 03/17/2022 4.3      Chloride 03/17/2022 106      Carbon Dioxide (CO2) 03/17/2022 27      Anion Gap 03/17/2022 6      Urea Nitrogen 03/17/2022 7 (A)     Creatinine 03/17/2022 0.59 (A)     Calcium 03/17/2022 7.9 (A)     Glucose 03/17/2022 121      Alkaline Phosphatase 03/17/2022 41 (A)     AST 03/17/2022 18      ALT 03/17/2022 9      Protein Total 03/17/2022 5.2 (A)     Albumin 03/17/2022 3.1 (A)     Bilirubin Total 03/17/2022 0.6      GFR Estimate 03/17/2022 >90      WBC Count 03/17/2022 8.2      RBC Count 03/17/2022 2.51 (A)     Hemoglobin 03/17/2022 8.6 (A)     Hematocrit 03/17/2022 26.1 (A)     MCV 03/17/2022 104 (A)     MCH 03/17/2022 34.3 (A)     MCHC 03/17/2022 33.0      RDW 03/17/2022 12.6      Platelet Count 03/17/2022 168      % Neutrophils 03/17/2022 81      % Lymphocytes 03/17/2022 8      % Monocytes 03/17/2022 10      % Eosinophils 03/17/2022 0      % Basophils 03/17/2022 0      % Immature Granulocytes 03/17/2022 1      NRBCs per 100 WBC 03/17/2022 0      Absolute Neutrophils 03/17/2022 6.7      Absolute Lymphocytes 03/17/2022 0.7 (A)     Absolute Monocytes 03/17/2022 0.8      Absolute Eosinophils 03/17/2022 0.0      Absolute Basophils 03/17/2022 0.0      Absolute Immature Granul* 03/17/2022 0.1      Absolute NRBCs 03/17/2022 0.0      GLUCOSE BY METER POCT 03/17/2022 116 (A)     GLUCOSE BY METER POCT 03/18/2022 98      WBC Count 03/19/2022 7.9      RBC Count 03/19/2022 2.46 (A)     Hemoglobin 03/19/2022 8.3 (A)     Hematocrit 03/19/2022 25.5 (A)     MCV 03/19/2022 104 (A)     MCH 03/19/2022 33.7 (A)     MCHC 03/19/2022 32.5      RDW 03/19/2022 12.4      Platelet Count 03/19/2022 197      Hold Specimen 03/19/2022 [facility]           Meera Barbosa PA-C

## 2022-03-20 ENCOUNTER — APPOINTMENT (OUTPATIENT)
Dept: RADIOLOGY | Facility: HOSPITAL | Age: 62
End: 2022-03-20
Attending: SURGERY
Payer: COMMERCIAL

## 2022-03-20 VITALS
HEART RATE: 84 BPM | DIASTOLIC BLOOD PRESSURE: 82 MMHG | WEIGHT: 186.19 LBS | OXYGEN SATURATION: 93 % | BODY MASS INDEX: 26.65 KG/M2 | SYSTOLIC BLOOD PRESSURE: 142 MMHG | HEIGHT: 70 IN | RESPIRATION RATE: 18 BRPM | TEMPERATURE: 98.1 F

## 2022-03-20 LAB
ERYTHROCYTE [DISTWIDTH] IN BLOOD BY AUTOMATED COUNT: 12.6 % (ref 10–15)
HCT VFR BLD AUTO: 25.4 % (ref 40–53)
HGB BLD-MCNC: 8.3 G/DL (ref 13.3–17.7)
MCH RBC QN AUTO: 33.3 PG (ref 26.5–33)
MCHC RBC AUTO-ENTMCNC: 32.7 G/DL (ref 31.5–36.5)
MCV RBC AUTO: 102 FL (ref 78–100)
PLATELET # BLD AUTO: 218 10E3/UL (ref 150–450)
RBC # BLD AUTO: 2.49 10E6/UL (ref 4.4–5.9)
WBC # BLD AUTO: 7.6 10E3/UL (ref 4–11)

## 2022-03-20 PROCEDURE — 99239 HOSP IP/OBS DSCHRG MGMT >30: CPT | Performed by: HOSPITALIST

## 2022-03-20 PROCEDURE — 99231 SBSQ HOSP IP/OBS SF/LOW 25: CPT | Performed by: PHYSICIAN ASSISTANT

## 2022-03-20 PROCEDURE — 71045 X-RAY EXAM CHEST 1 VIEW: CPT

## 2022-03-20 PROCEDURE — 250N000013 HC RX MED GY IP 250 OP 250 PS 637: Performed by: SURGERY

## 2022-03-20 PROCEDURE — 36415 COLL VENOUS BLD VENIPUNCTURE: CPT | Performed by: HOSPITALIST

## 2022-03-20 PROCEDURE — 85027 COMPLETE CBC AUTOMATED: CPT | Performed by: HOSPITALIST

## 2022-03-20 RX ADMIN — ACETAMINOPHEN 650 MG: 325 TABLET ORAL at 06:29

## 2022-03-20 ASSESSMENT — ACTIVITIES OF DAILY LIVING (ADL)
ADLS_ACUITY_SCORE: 9

## 2022-03-20 NOTE — PLAN OF CARE
Problem: Pain Acute  Goal: Acceptable Pain Control and Functional Ability  Outcome: Ongoing, Progressing  Intervention: Develop Pain Management Plan  Recent Flowsheet Documentation  Taken 3/19/2022 2325 by Radha Santos, RN  Pain Management Interventions:   medication (see MAR)   rest   repositioned  Intervention: Prevent or Manage Pain  Recent Flowsheet Documentation  Taken 3/19/2022 2325 by Radha Santos, RN  Medication Review/Management: medications reviewed   Goal Outcome Evaluation:    Vitally stable, minimal pain left back, dressing C/D/I, ambulating to bathroom independently, no acute events overnight.

## 2022-03-20 NOTE — PROGRESS NOTES
Chart check- Xray good. Ok discharge. Will place discharge recs in.   Meera Barbosa PAPattC [phone]

## 2022-03-20 NOTE — DISCHARGE INSTRUCTIONS
Keep dressing over chest tube site on for 48 hours then remove. May stil keep covered with gauze if desired.     Keep left arm in sling until follow up with primary provider.     Follow up with Dr Torres in two weeks. I sent a message to our team to schedule.     From your Surgeon:    Follow up: It is our practice to have all patients follow up with us 2-3 weeks after their surgery to ensure they are recovering well.  For straightforward laparoscopic procedures, this can be doneeither in clinic as a scheduled follow up appointment, or over the phone.  If you would like a scheduled follow up appointment in clinic, please call us at [phone] to schedule an appointment at your convenience.  If youwould prefer to follow up with us by phone please let us know so that we may contact you 2-3 weeks following your procedure.        Diet: Regular diet. Patients can have difficulty with constipation following surgery,due in part to the administration of narcotic medications.  If you are suffering with constipation, you should avoid foods such as hard cheeses or red meat.  Foods high in fiber are recommended.      Activity: Youshould continue to be active at home, including ambulating frequently.  If possible try to limit the amount of time spent in bed.    Restrictions: You should not lift greater than 15 pounds for 2 weeks, and will want toavoid strenuous physical activity for 1-2 weeks.  You should limit your physical activity if it causes you discomfort; however, this should resolve within 1-2 weeks.   Walking does not count as strenuous physical activity.You are safe to walk up and down stairs.  Following 2 weeks you may resume all normal physical activity.    Wound / drain care: You may remove your Band-aids after a period of 24 hours.  The small white strips on theincisions act like artificial scabs, and will begin to peel at the edges at around 5-7 days.  These can then be removed.    It is normal to have a small rim  of red present around the incisions. This should not, however,extend beyond 1/4 inch from the incision.  If your incisions become increasingly tender, red, or draining, please contact us.       Bathing: You may shower after 24 hours from surgery.  It is ok to get your incisionswet, but avoid rubbing them.  Avoid soaking in bath tubs, or swimming in lakes, pools, or streams for 2 weeks following surgery.

## 2022-03-20 NOTE — DISCHARGE SUMMARY
.    Discharge Summary     Primary Care Physician: System, Provider Not In  Admission Date: 3/16/2022   Discharge Provider: Lorin Peacock MD Discharge Date: 3/20/2022   Diet: Regular diet   Code Status: Full Code   Activity: As tolerated        Condition at Discharge: Stable      REASON FOR PRESENTATION(See Admission Note for Details)     S/p fall, rib fractures, left hemothorax    PRINCIPAL & ACTIVE DISCHARGE DIAGNOSES     Active Problems:    Hemothorax on left      SIGNIFICANT FINDINGS (Imaging, labs):     CT Chest/Abdomen/Pelvis w Contrast  Result Date: 3/16/2022      IMPRESSION: 1.  Multiple displaced left rib fractures including ribs 2, 3, 4, and 8 through 12. The posterior 10th rib is moderately displaced. 2.  Large left hemothorax and small left pneumothorax related to #1. 3.  Compression of the left lung secondary to #2 with multisegment collapse of the basilar segments of the left lower lobe.    XR Chest Port 1 View  Result Date: 3/20/2022      IMPRESSION: Interval removal of the left basilar chest tubes. Unchanged minimal left apical pneumothorax. Minimal pleural fluid and atelectasis left lung base essentially unchanged. Minimal right basilar atelectasis and pleural fluid. Normal heart size and pulmonary vascularity. Subcutaneous emphysema left supraclavicular region. Marked degenerative changes both glenohumeral joints with loose body formation of the right. Posttraumatic chronic deformity left AC joint and distal clavicle.    XR Chest Port 1 View  Result Date: 3/19/2022      IMPRESSION: Unchanged minimal left apical pneumothorax. Stable positioning of left-sided chest tubes. Unchanged atelectasis left lung base and minimal atelectasis right lung base. Normal heart size and pulmonary vascularity. Degenerative changes both glenohumeral joints. Subcutaneous emphysema left supraclavicular region unchanged. Posttraumatic deformity distal left clavicle.    XR Chest Port 1 View  Result Date:  3/18/2022      IMPRESSION: 03/17/2022 2 left-sided chest tubes. Very tiny left apical pneumothorax visible on 3 mm pleural separation. Stable atelectasis and pleural fluid left lung base. Interval decrease in right basilar atelectasis. Normal heart size and pulmonary vascularity. Marked degenerative changes right glenohumeral joint with loose body formation.    XR Chest Port 1 View  Result Date: 3/17/2022      IMPRESSION: Stable positioning of 2 left-sided chest tubes. No pneumothorax. Minimal atelectasis involving the left lung base. Minimal atelectasis and pleural fluid right lung base unchanged. Heart size normal. Normal pulmonary vascularity. Minimal subcutaneous emphysema left lateral chest wall left-sided clavicular region. Multiple displaced left rib fractures unchanged. Displaced distal left clavicular fracture unchanged.    XR Chest Port 1 View  Result Date: 3/16/2022       IMPRESSION: Interval surgical evacuation of the large left hemothorax with placement of apical and basilar chest tubes. Subcutaneous emphysema left lower lateral chest wall and left neck base. Near complete reexpansion of the left upper lobe and improved  aeration of the left lower lobe with residual consolidation and volume loss in the basilar segments. Consolidation and volume loss basilar segments right lower lobe. Shallow inspiration exaggerates heart size and pulmonary vascularity which appear to be  within normal limits. Comminuted and displaced fracture distal left clavicle with superior subluxation. Fractures involving left ribs 2-4 and 8-12 as detailed in today's earlier CT are not well visualized radiographically.    XR Shoulder Left G/E 3 Views  Result Date: 3/15/2022      IMPRESSION: Fracture of the distal clavicle and subluxation at the AC joint. The acromion on is subluxed approximately 4 cm inferior to the distal clavicle. Chronic deformity of the humeral head. No dislocation.       PENDING LABS      [unfilled]    PROCEDURES ( this hospitalization only)      Procedure(s):  VIDEO-ASSISTED THORACOSCOPIC SURGERY (VATS) EVACUATION OF HEMOTHORAX    RECOMMENDATIONS TO OUTPATIENT PROVIDER FOR F/U VISIT     Follow up with PCP in 1 week    DISPOSITION     Home    SUMMARY OF HOSPITAL COURSE:    61 year old male presented to ED for evaluation of abdominal distention.  Patient had a fall accident 2 days ago when he fell down 15 flight of stairs.  Chest x-ray patient was found to have hemothorax, s/p chest tube placement by surgery.      Left hemothorax  -Patient sustained fall accident 2 days PTA by falling down 15 flight of stairs.  -He came on the day of fall and was found to have clavicular fracture and was sent home at that time. Pt returned with abd distension and work up with CT chest reported as multiple displaced left rib fractures including ribs 2, 3, 4, and 8 through 12. The posterior 10th rib is moderately displaced.Large left hemothorax and small left pneumothorax Compression of the left lung secondary with multisegment collapse of the basilar segments of the left lower lobe.  -S/p VATS and left chest tube placement, managed by surgery.   -Serial chest x-rays stable. Chest tube and epidural removed on 3/19  -Encourage IS. Stable for discharge home today        Arthritis of right hip, shoulder  -Pain management     Acute blood loss anemia   -Hb dropped from 11.3-->8.6-->8.3 on day of discharg likely 2/2 to hemothorax.  -Repeat CBC at next PCP visit.    .Patient stable to be discharged home today. Please refer to discharge medications and instructions for more details.        Discharge Medications with Med changes:     There are no discharge medications for this patient.          Rationale for medication changes:      See med rec        Consults   General surgery      Immunizations given this encounter     [unfilled]       Anticoagulation Information      Recent INR results: No results for  input(s): INR in the last 168 hours.  Warfarin doses (if applicable) or name of other anticoagulant: N/A      Discharge Orders     Discharge Procedure Orders   Reason for your hospital stay   Order Comments: S/p fall, hemothorax     Follow-up and recommended labs and tests    Order Comments: Follow up with primary care provider, Provider Not In System, within 7 days for hospital follow- up.  The following labs/tests are recommended: CBC.    Follow up with general surgery as needed     Activity   Order Comments: Your activity upon discharge: activity as tolerated     Order Specific Question Answer Comments   Is discharge order? Yes      Diet   Order Comments: Follow this diet upon discharge: Orders Placed This Encounter      Room Service      Regular Diet Adult     Order Specific Question Answer Comments   Is discharge order? Yes      Examination     Vital Signs in last 24 hours:     General Appearance:    Alert, cooperative, no distress, appears stated age   Lungs:     Diminished BS.   Cardiovascular:    RRR   Abdomen:     Soft   Neurologic:   Awake, alert, oriented, speech clear, moving all extremities           Please see EMR for more detailed significant labs, imaging, consultant notes etc.  Total time spent on discharge: 35 minutes    Lorin Peacock MD  [facility] Service: Ph:[phone]    CC:System, Provider Not In

## 2022-03-20 NOTE — PLAN OF CARE
Problem: Plan of Care - These are the overarching goals to be used throughout the patient stay.    Goal: Readiness for Transition of Care  Outcome: Adequate for Care Transition   Goal Outcome Evaluation:     Patient discharging home, discharge instructions went over with patient and spouse including restrictions and follow up, questions answered and both verbalized understanding. Wife transporting.

## 2022-04-03 ENCOUNTER — HEALTH MAINTENANCE LETTER (OUTPATIENT)
Age: 62
End: 2022-04-03

## 2022-10-03 ENCOUNTER — HEALTH MAINTENANCE LETTER (OUTPATIENT)
Age: 62
End: 2022-10-03

## 2023-02-11 ENCOUNTER — HEALTH MAINTENANCE LETTER (OUTPATIENT)
Age: 63
End: 2023-02-11

## 2024-03-09 ENCOUNTER — HEALTH MAINTENANCE LETTER (OUTPATIENT)
Age: 64
End: 2024-03-09

## 2024-07-27 ENCOUNTER — HEALTH MAINTENANCE LETTER (OUTPATIENT)
Age: 64
End: 2024-07-27

## (undated) DEVICE — ENDO TROCAR SLEEVE KII Z-THREADED 11X100MM CTS12

## (undated) DEVICE — DRSG GAUZE 4X4" TRAY

## (undated) DEVICE — CATH THORACIC RT ANGLE CLOTSTOP 28FR

## (undated) DEVICE — PREP CHLORAPREP 26ML TINTED HI-LITE ORANGE 930815

## (undated) DEVICE — TAPE MICROFOAM 4" 1528-4

## (undated) DEVICE — CONNECTOR 5 TO 1 STRL 271502

## (undated) DEVICE — TUBING SMOKE EVAC PNEUMOCLEAR HIGH FLOW 0620050250

## (undated) DEVICE — DRSG GAUZE 4X4" 3033

## (undated) DEVICE — TUBING SUCTION MEDI-VAC 1/4"X20' N620A

## (undated) DEVICE — ENDO TROCAR FIRST ENTRY KII FIOS Z-THRD 05X100MM CTF03

## (undated) DEVICE — CUSTOM PACK LAP CHOLE SBA5BLCHEA

## (undated) DEVICE — ENDO TROCAR FIRST ENTRY KII FIOS Z-THRD 11X100MM CTF33

## (undated) DEVICE — ESU PENCIL SMOKE EVAC W/ROCKER SWITCH 0703-047-000

## (undated) DEVICE — Device

## (undated) DEVICE — TUBING SUCTION MEDI-VAC 1/4"X20' N620A - HE

## (undated) DEVICE — ENDO TROCAR OPTICAL ACCESS KII Z-THRD 05X100MM CTR03

## (undated) DEVICE — DRESSING XEROFORM PETROLATUM 5X9 33605

## (undated) DEVICE — BLADE KNIFE SURG 11 371111

## (undated) DEVICE — ENDO TROCAR SLEEVE KII Z-THREADED 05X100MM CTS02

## (undated) DEVICE — DRSG DRAIN 4X4" 7086

## (undated) DEVICE — SUTURE SILK 0 PSL 580H

## (undated) DEVICE — SUCTION DRY CHEST DRAIN OASIS 3600-100

## (undated) DEVICE — GLOVE BIOGEL PI ORTHOPRO SZ 7.5 47675

## (undated) DEVICE — SU MONOCRYL+ 4-0 18IN PS2 UND MCP496G

## (undated) DEVICE — SOL WATER IRRIG 1000ML BOTTLE 2F7114

## (undated) DEVICE — CONNECTOR CARDIO BLAKE DRAIN BCC2

## (undated) DEVICE — ENDO TROCAR SHIELDED BLADED KII Z-THRD 11X100MM CTB33

## (undated) DEVICE — CATH THORACIC STRAIGHT CLOTSTOP 28FR

## (undated) DEVICE — TUBING LAP SUCT/IRRIG STRYKER 250070500

## (undated) DEVICE — PLATE GROUNDING ADULT W/CORD 9165L

## (undated) RX ORDER — ONDANSETRON 2 MG/ML
INJECTION INTRAMUSCULAR; INTRAVENOUS
Status: DISPENSED
Start: 2022-03-16

## (undated) RX ORDER — PROPOFOL 10 MG/ML
INJECTION, EMULSION INTRAVENOUS
Status: DISPENSED
Start: 2022-03-16

## (undated) RX ORDER — FENTANYL CITRATE 50 UG/ML
INJECTION, SOLUTION INTRAMUSCULAR; INTRAVENOUS
Status: DISPENSED
Start: 2022-03-16

## (undated) RX ORDER — KETAMINE HYDROCHLORIDE 10 MG/ML
INJECTION INTRAMUSCULAR; INTRAVENOUS
Status: DISPENSED
Start: 2022-03-16

## (undated) RX ORDER — DEXAMETHASONE SODIUM PHOSPHATE 10 MG/ML
INJECTION INTRAMUSCULAR; INTRAVENOUS
Status: DISPENSED
Start: 2022-03-16